# Patient Record
Sex: FEMALE | Race: WHITE | NOT HISPANIC OR LATINO | URBAN - METROPOLITAN AREA
[De-identification: names, ages, dates, MRNs, and addresses within clinical notes are randomized per-mention and may not be internally consistent; named-entity substitution may affect disease eponyms.]

---

## 2020-02-16 ENCOUNTER — INPATIENT (INPATIENT)
Facility: HOSPITAL | Age: 65
LOS: 4 days | Discharge: HOME | DRG: 291 | End: 2020-02-21
Attending: INTERNAL MEDICINE | Admitting: STUDENT IN AN ORGANIZED HEALTH CARE EDUCATION/TRAINING PROGRAM
Payer: COMMERCIAL

## 2020-02-16 VITALS — WEIGHT: 199.96 LBS | TEMPERATURE: 98 F

## 2020-02-16 DIAGNOSIS — I50.1 LEFT VENTRICULAR FAILURE, UNSPECIFIED: ICD-10-CM

## 2020-02-16 DIAGNOSIS — K38.9 DISEASE OF APPENDIX, UNSPECIFIED: Chronic | ICD-10-CM

## 2020-02-16 LAB
ALBUMIN SERPL ELPH-MCNC: 3.5 G/DL — SIGNIFICANT CHANGE UP (ref 3.3–5.2)
ALP SERPL-CCNC: 251 U/L — HIGH (ref 40–120)
ALT FLD-CCNC: 42 U/L — HIGH
ANION GAP SERPL CALC-SCNC: 16 MMOL/L — SIGNIFICANT CHANGE UP (ref 5–17)
APTT BLD: 33 SEC — SIGNIFICANT CHANGE UP (ref 27.5–36.3)
AST SERPL-CCNC: 39 U/L — HIGH
BILIRUB SERPL-MCNC: 0.4 MG/DL — SIGNIFICANT CHANGE UP (ref 0.4–2)
BUN SERPL-MCNC: 32 MG/DL — HIGH (ref 8–20)
CALCIUM SERPL-MCNC: 9.6 MG/DL — SIGNIFICANT CHANGE UP (ref 8.6–10.2)
CHLORIDE SERPL-SCNC: 106 MMOL/L — SIGNIFICANT CHANGE UP (ref 98–107)
CO2 SERPL-SCNC: 19 MMOL/L — LOW (ref 22–29)
CREAT SERPL-MCNC: 1.07 MG/DL — SIGNIFICANT CHANGE UP (ref 0.5–1.3)
GLUCOSE SERPL-MCNC: 298 MG/DL — HIGH (ref 70–99)
HCT VFR BLD CALC: 34.5 % — SIGNIFICANT CHANGE UP (ref 34.5–45)
HGB BLD-MCNC: 10.5 G/DL — LOW (ref 11.5–15.5)
INR BLD: 0.93 RATIO — SIGNIFICANT CHANGE UP (ref 0.88–1.16)
MCHC RBC-ENTMCNC: 29 PG — SIGNIFICANT CHANGE UP (ref 27–34)
MCHC RBC-ENTMCNC: 30.4 GM/DL — LOW (ref 32–36)
MCV RBC AUTO: 95.3 FL — SIGNIFICANT CHANGE UP (ref 80–100)
NT-PROBNP SERPL-SCNC: 704 PG/ML — HIGH (ref 0–300)
PLATELET # BLD AUTO: 262 K/UL — SIGNIFICANT CHANGE UP (ref 150–400)
POTASSIUM SERPL-MCNC: 4.5 MMOL/L — SIGNIFICANT CHANGE UP (ref 3.5–5.3)
POTASSIUM SERPL-SCNC: 4.5 MMOL/L — SIGNIFICANT CHANGE UP (ref 3.5–5.3)
PROT SERPL-MCNC: 6.9 G/DL — SIGNIFICANT CHANGE UP (ref 6.6–8.7)
PROTHROM AB SERPL-ACNC: 10.5 SEC — SIGNIFICANT CHANGE UP (ref 10–12.9)
RBC # BLD: 3.62 M/UL — LOW (ref 3.8–5.2)
RBC # FLD: 13.3 % — SIGNIFICANT CHANGE UP (ref 10.3–14.5)
SODIUM SERPL-SCNC: 141 MMOL/L — SIGNIFICANT CHANGE UP (ref 135–145)
TROPONIN T SERPL-MCNC: <0.01 NG/ML — SIGNIFICANT CHANGE UP (ref 0–0.06)
WBC # BLD: 8.08 K/UL — SIGNIFICANT CHANGE UP (ref 3.8–10.5)
WBC # FLD AUTO: 8.08 K/UL — SIGNIFICANT CHANGE UP (ref 3.8–10.5)

## 2020-02-16 PROCEDURE — 99223 1ST HOSP IP/OBS HIGH 75: CPT

## 2020-02-16 PROCEDURE — 93970 EXTREMITY STUDY: CPT | Mod: 26

## 2020-02-16 PROCEDURE — 71275 CT ANGIOGRAPHY CHEST: CPT | Mod: 26

## 2020-02-16 PROCEDURE — 71045 X-RAY EXAM CHEST 1 VIEW: CPT | Mod: 26

## 2020-02-16 PROCEDURE — 93010 ELECTROCARDIOGRAM REPORT: CPT

## 2020-02-16 PROCEDURE — 99285 EMERGENCY DEPT VISIT HI MDM: CPT

## 2020-02-16 RX ORDER — FUROSEMIDE 40 MG
40 TABLET ORAL ONCE
Refills: 0 | Status: COMPLETED | OUTPATIENT
Start: 2020-02-16 | End: 2020-02-16

## 2020-02-16 RX ORDER — ASPIRIN/CALCIUM CARB/MAGNESIUM 324 MG
325 TABLET ORAL ONCE
Refills: 0 | Status: COMPLETED | OUTPATIENT
Start: 2020-02-16 | End: 2020-02-16

## 2020-02-16 RX ORDER — SODIUM CHLORIDE 9 MG/ML
1000 INJECTION, SOLUTION INTRAVENOUS
Refills: 0 | Status: DISCONTINUED | OUTPATIENT
Start: 2020-02-16 | End: 2020-02-21

## 2020-02-16 RX ORDER — HEPARIN SODIUM 5000 [USP'U]/ML
5000 INJECTION INTRAVENOUS; SUBCUTANEOUS EVERY 8 HOURS
Refills: 0 | Status: DISCONTINUED | OUTPATIENT
Start: 2020-02-16 | End: 2020-02-21

## 2020-02-16 RX ORDER — GLUCAGON INJECTION, SOLUTION 0.5 MG/.1ML
1 INJECTION, SOLUTION SUBCUTANEOUS ONCE
Refills: 0 | Status: DISCONTINUED | OUTPATIENT
Start: 2020-02-16 | End: 2020-02-21

## 2020-02-16 RX ORDER — FUROSEMIDE 40 MG
20 TABLET ORAL ONCE
Refills: 0 | Status: COMPLETED | OUTPATIENT
Start: 2020-02-16 | End: 2020-02-17

## 2020-02-16 RX ORDER — DEXTROSE 50 % IN WATER 50 %
15 SYRINGE (ML) INTRAVENOUS ONCE
Refills: 0 | Status: DISCONTINUED | OUTPATIENT
Start: 2020-02-16 | End: 2020-02-21

## 2020-02-16 RX ORDER — METOPROLOL TARTRATE 50 MG
50 TABLET ORAL ONCE
Refills: 0 | Status: COMPLETED | OUTPATIENT
Start: 2020-02-16 | End: 2020-02-16

## 2020-02-16 RX ORDER — DEXTROSE 50 % IN WATER 50 %
25 SYRINGE (ML) INTRAVENOUS ONCE
Refills: 0 | Status: DISCONTINUED | OUTPATIENT
Start: 2020-02-16 | End: 2020-02-21

## 2020-02-16 RX ORDER — DEXTROSE 50 % IN WATER 50 %
12.5 SYRINGE (ML) INTRAVENOUS ONCE
Refills: 0 | Status: DISCONTINUED | OUTPATIENT
Start: 2020-02-16 | End: 2020-02-21

## 2020-02-16 RX ORDER — FUROSEMIDE 40 MG
40 TABLET ORAL
Refills: 0 | Status: DISCONTINUED | OUTPATIENT
Start: 2020-02-17 | End: 2020-02-18

## 2020-02-16 RX ORDER — INSULIN LISPRO 100/ML
VIAL (ML) SUBCUTANEOUS
Refills: 0 | Status: DISCONTINUED | OUTPATIENT
Start: 2020-02-16 | End: 2020-02-21

## 2020-02-16 RX ORDER — HYDRALAZINE HCL 50 MG
25 TABLET ORAL THREE TIMES A DAY
Refills: 0 | Status: DISCONTINUED | OUTPATIENT
Start: 2020-02-16 | End: 2020-02-17

## 2020-02-16 RX ADMIN — Medication 50 MILLIGRAM(S): at 17:42

## 2020-02-16 RX ADMIN — Medication 40 MILLIGRAM(S): at 16:37

## 2020-02-16 RX ADMIN — Medication 325 MILLIGRAM(S): at 16:21

## 2020-02-16 NOTE — H&P ADULT - NSHPLABSRESULTS_GEN_ALL_CORE
LABS:                        10.5   8.08  )-----------( 262      ( 16 Feb 2020 14:23 )             34.5     02-16    141  |  106  |  32.0<H>  ----------------------------<  298<H>  4.5   |  19.0<L>  |  1.07    Ca    9.6      16 Feb 2020 14:23    TPro  6.9  /  Alb  3.5  /  TBili  0.4  /  DBili  x   /  AST  39<H>  /  ALT  42<H>  /  AlkPhos  251<H>  02-16    PT/INR - ( 16 Feb 2020 14:23 )   PT: 10.5 sec;   INR: 0.93 ratio         PTT - ( 16 Feb 2020 14:23 )  PTT:33.0 sec    LIVER FUNCTIONS - ( 16 Feb 2020 14:23 )  Alb: 3.5 g/dL / Pro: 6.9 g/dL / ALK PHOS: 251 U/L / ALT: 42 U/L / AST: 39 U/L / GGT: x

## 2020-02-16 NOTE — ED PROVIDER NOTE - CARE PLAN
Principal Discharge DX:	Acute pulmonary edema with congestive heart failure  Secondary Diagnosis:	Hypertensive urgency  Secondary Diagnosis:	New onset of congestive heart failure

## 2020-02-16 NOTE — H&P ADULT - HISTORY OF PRESENT ILLNESS
64 year old female with hx of DM , CKD morbid obesity is coming in with c/o SOB for 5 days , patient stated that she has been on a cruise for 14 days , with SOB was seen on cruise diagnosed to have PNA , given antibiotics however had no improvement of symptoms, she also had cough but no sputum , no fever m chills night sweats   patient did have LE SWELLING , WEIGHT GAIN , LEG PAIN but denies orthopnea , did have exertional dyspnea. denies chest pain , no abdominal pain , no nausea vomiting.  while in ED found to be hypertensive treated with IV meds, and also CTA showed effusions and CHF given lasix . patient was also hypoxic requiring oxygen supplementation .

## 2020-02-16 NOTE — ED ADULT TRIAGE NOTE - CHIEF COMPLAINT QUOTE
"when I walk I can't breath and my legs are swollen.  I had an EKG done at the Urgent care and they sent me here. I was on a cruise an dgot back today after 14 days. "  Pt was SOB on the cruise for last 5 days.  Pt A& Ox4

## 2020-02-16 NOTE — ED PROVIDER NOTE - CLINICAL SUMMARY MEDICAL DECISION MAKING FREE TEXT BOX
patient arrived with sob/gayle/le edema.  labs sent. cta ordered. signed out to incoming attending pending results. Patient signed out to incoming physician.  All decisions regarding the progression of care will be made at their discretion.

## 2020-02-16 NOTE — ED PROVIDER NOTE - OBJECTIVE STATEMENT
64yoF; with pmh signif for HTN, HLD, DM; now p/w sob x5 days, associated with gayle, orthopnea, LE edema and L LE pain. c/o chest pain--sscp, tightness, radiating to left shoulder.  c/o cough. denies f/c/s. denies n/v. denies diaphoresis. denies numbness/tingling. denies lightheadedness.  patient drove to [place] in October and fly back to [place] this week and was on a cruise for 1 week.    PMH: HTN, HLD, DM  SOCIAL: No tobacco/illicit substance use/socialEtOH

## 2020-02-16 NOTE — ED ADULT NURSE NOTE - OBJECTIVE STATEMENT
Assumed care at 1400 pt co bilaterally edema to lower extremities and SOB. pt was on a 14 day cruise to the [place] [facility] [place] and 5 days ago noticed the leg swelling and SOB. pt was treated in the nurse office but has not had any relief. pt has hx of kidney failure, HTN and DM. pt placed on cardiac monitor.

## 2020-02-16 NOTE — ED CLERICAL - CLERICAL COMMENTS
[facility] sending Ivanna Mtz [birth year]- SOB, swelling in lower extremeties-> [facility] [phone]

## 2020-02-16 NOTE — ED PROVIDER NOTE - NS ED ROS FT
Constitutional: (-) fever  (-)chills  (-)sweats  Eyes/ENT: (-) blurry vision, (-) epistaxis  (-)rhinorrhea   (-) sore throat    Cardiovascular: (+) chest pain, (-) palpitations (+) edema   Respiratory: (+) cough, (+) shortness of breath   Gastrointestinal: (-)nausea  (-)vomiting, (-) diarrhea  (-) abdominal pain   :  (-)dysuria, (-)frequency, (-)urgency, (-)hematuria  Musculoskeletal: (-) neck pain, (-) back pain, (-) joint pain  Integumentary: (-) rash, (-) edema  Neurological: (-) headache, (-) altered mental status  (-)LOC

## 2020-02-16 NOTE — H&P ADULT - ASSESSMENT
1. Acute CHF exacerbation   newly diagnosed , probably diastolic .  ECHO , tele monitoring , I/O , daily weights .  lasix 40 BID.  cardio consult .  low salt diet     2. Acute hypoxic respiratory failure   on nasal cannula .  treat underlying CHF .    3. Hypertensive crises / hypertensive emergency   better controlled now.  patient takes lisinopril at home , hold off for now, give room for diuresis .  avoid beta blocker due to acute decompensated CHF   start hydralazine .    4. DM   start sliding scale. based on needs start basal bolus approach in am .    5. CKD   monitor kidney function with lasix on board     6. DLP  start lipitor, patient does not recall her home dose.     7.Morbid obesity   lifestyle modification     8. DVT prophylaxis  heparin     details discussed with patient and her  at bedside , all concerns answered

## 2020-02-16 NOTE — H&P ADULT - NSHPPHYSICALEXAM_GEN_ALL_CORE
GENERAL:  morbidly obese , ill looking , not in distress , speaking in full sentences   EYES:  Clear conjuctiva, extraocular movement intact  RESP:  diminished air entry , fine crackles at bases   CV: Regular rate and rhythm, no murmurs appreciated  GI: Soft, non-tender, non-distended  EXT: positive  pitting edema

## 2020-02-16 NOTE — ED PROVIDER NOTE - PHYSICAL EXAMINATION
General:  mild resp distress  Head:     NC/AT, EOMI, oral mucosa moist  Neck:     trachea midline  Lungs:  bibasilar crackles  CVS:     S1S2, RRR, no m/g/r  Abd:     +BS, s/nt/nd, no organomegaly  Ext:    2+ radial and pedal pulses, 1+ pedal edema  Neuro: AAOx3, no sensory/motor deficits

## 2020-02-17 DIAGNOSIS — I10 ESSENTIAL (PRIMARY) HYPERTENSION: ICD-10-CM

## 2020-02-17 DIAGNOSIS — E11.22 TYPE 2 DIABETES MELLITUS WITH DIABETIC CHRONIC KIDNEY DISEASE: ICD-10-CM

## 2020-02-17 DIAGNOSIS — I50.9 HEART FAILURE, UNSPECIFIED: ICD-10-CM

## 2020-02-17 DIAGNOSIS — E78.5 HYPERLIPIDEMIA, UNSPECIFIED: ICD-10-CM

## 2020-02-17 LAB
ANION GAP SERPL CALC-SCNC: 13 MMOL/L — SIGNIFICANT CHANGE UP (ref 5–17)
BUN SERPL-MCNC: 35 MG/DL — HIGH (ref 8–20)
CALCIUM SERPL-MCNC: 9.6 MG/DL — SIGNIFICANT CHANGE UP (ref 8.6–10.2)
CHLORIDE SERPL-SCNC: 106 MMOL/L — SIGNIFICANT CHANGE UP (ref 98–107)
CO2 SERPL-SCNC: 23 MMOL/L — SIGNIFICANT CHANGE UP (ref 22–29)
CREAT SERPL-MCNC: 1.24 MG/DL — SIGNIFICANT CHANGE UP (ref 0.5–1.3)
GLUCOSE BLDC GLUCOMTR-MCNC: 281 MG/DL — HIGH (ref 70–99)
GLUCOSE BLDC GLUCOMTR-MCNC: 315 MG/DL — HIGH (ref 70–99)
GLUCOSE BLDC GLUCOMTR-MCNC: 329 MG/DL — HIGH (ref 70–99)
GLUCOSE BLDC GLUCOMTR-MCNC: 343 MG/DL — HIGH (ref 70–99)
GLUCOSE BLDC GLUCOMTR-MCNC: 349 MG/DL — HIGH (ref 70–99)
GLUCOSE BLDC GLUCOMTR-MCNC: 351 MG/DL — HIGH (ref 70–99)
GLUCOSE SERPL-MCNC: 354 MG/DL — HIGH (ref 70–99)
HBA1C BLD-MCNC: 7.5 % — HIGH (ref 4–5.6)
HCV AB S/CO SERPL IA: 0.15 S/CO — SIGNIFICANT CHANGE UP (ref 0–0.99)
HCV AB SERPL-IMP: SIGNIFICANT CHANGE UP
POTASSIUM SERPL-MCNC: 4.6 MMOL/L — SIGNIFICANT CHANGE UP (ref 3.5–5.3)
POTASSIUM SERPL-SCNC: 4.6 MMOL/L — SIGNIFICANT CHANGE UP (ref 3.5–5.3)
SODIUM SERPL-SCNC: 142 MMOL/L — SIGNIFICANT CHANGE UP (ref 135–145)

## 2020-02-17 PROCEDURE — 99255 IP/OBS CONSLTJ NEW/EST HI 80: CPT

## 2020-02-17 PROCEDURE — 99233 SBSQ HOSP IP/OBS HIGH 50: CPT

## 2020-02-17 PROCEDURE — 93306 TTE W/DOPPLER COMPLETE: CPT | Mod: 26

## 2020-02-17 RX ORDER — LOSARTAN POTASSIUM 100 MG/1
50 TABLET, FILM COATED ORAL DAILY
Refills: 0 | Status: DISCONTINUED | OUTPATIENT
Start: 2020-02-17 | End: 2020-02-18

## 2020-02-17 RX ORDER — INSULIN GLARGINE 100 [IU]/ML
10 INJECTION, SOLUTION SUBCUTANEOUS EVERY MORNING
Refills: 0 | Status: DISCONTINUED | OUTPATIENT
Start: 2020-02-17 | End: 2020-02-17

## 2020-02-17 RX ORDER — INSULIN LISPRO 100/ML
7 VIAL (ML) SUBCUTANEOUS
Refills: 0 | Status: DISCONTINUED | OUTPATIENT
Start: 2020-02-17 | End: 2020-02-18

## 2020-02-17 RX ORDER — INSULIN GLARGINE 100 [IU]/ML
18 INJECTION, SOLUTION SUBCUTANEOUS AT BEDTIME
Refills: 0 | Status: DISCONTINUED | OUTPATIENT
Start: 2020-02-17 | End: 2020-02-18

## 2020-02-17 RX ORDER — INSULIN LISPRO 100/ML
5 VIAL (ML) SUBCUTANEOUS
Refills: 0 | Status: DISCONTINUED | OUTPATIENT
Start: 2020-02-17 | End: 2020-02-17

## 2020-02-17 RX ORDER — SIMVASTATIN 20 MG/1
40 TABLET, FILM COATED ORAL AT BEDTIME
Refills: 0 | Status: DISCONTINUED | OUTPATIENT
Start: 2020-02-17 | End: 2020-02-21

## 2020-02-17 RX ORDER — SIMETHICONE 80 MG/1
80 TABLET, CHEWABLE ORAL ONCE
Refills: 0 | Status: COMPLETED | OUTPATIENT
Start: 2020-02-17 | End: 2020-02-17

## 2020-02-17 RX ORDER — CARVEDILOL PHOSPHATE 80 MG/1
3.12 CAPSULE, EXTENDED RELEASE ORAL EVERY 12 HOURS
Refills: 0 | Status: DISCONTINUED | OUTPATIENT
Start: 2020-02-17 | End: 2020-02-19

## 2020-02-17 RX ORDER — PANTOPRAZOLE SODIUM 20 MG/1
40 TABLET, DELAYED RELEASE ORAL ONCE
Refills: 0 | Status: COMPLETED | OUTPATIENT
Start: 2020-02-17 | End: 2020-02-17

## 2020-02-17 RX ADMIN — CARVEDILOL PHOSPHATE 3.12 MILLIGRAM(S): 80 CAPSULE, EXTENDED RELEASE ORAL at 15:42

## 2020-02-17 RX ADMIN — INSULIN GLARGINE 18 UNIT(S): 100 INJECTION, SOLUTION SUBCUTANEOUS at 23:50

## 2020-02-17 RX ADMIN — Medication 4: at 08:59

## 2020-02-17 RX ADMIN — HEPARIN SODIUM 5000 UNIT(S): 5000 INJECTION INTRAVENOUS; SUBCUTANEOUS at 15:42

## 2020-02-17 RX ADMIN — INSULIN GLARGINE 10 UNIT(S): 100 INJECTION, SOLUTION SUBCUTANEOUS at 10:56

## 2020-02-17 RX ADMIN — LOSARTAN POTASSIUM 50 MILLIGRAM(S): 100 TABLET, FILM COATED ORAL at 05:22

## 2020-02-17 RX ADMIN — Medication 7 UNIT(S): at 17:11

## 2020-02-17 RX ADMIN — Medication 5: at 10:56

## 2020-02-17 RX ADMIN — Medication 40 MILLIGRAM(S): at 05:24

## 2020-02-17 RX ADMIN — CARVEDILOL PHOSPHATE 3.12 MILLIGRAM(S): 80 CAPSULE, EXTENDED RELEASE ORAL at 05:22

## 2020-02-17 RX ADMIN — Medication 5 UNIT(S): at 15:40

## 2020-02-17 RX ADMIN — SIMVASTATIN 40 MILLIGRAM(S): 20 TABLET, FILM COATED ORAL at 23:05

## 2020-02-17 RX ADMIN — SIMETHICONE 80 MILLIGRAM(S): 80 TABLET, CHEWABLE ORAL at 23:50

## 2020-02-17 RX ADMIN — Medication 4: at 15:40

## 2020-02-17 RX ADMIN — Medication 20 MILLIGRAM(S): at 10:58

## 2020-02-17 RX ADMIN — PANTOPRAZOLE SODIUM 40 MILLIGRAM(S): 20 TABLET, DELAYED RELEASE ORAL at 23:51

## 2020-02-17 RX ADMIN — HEPARIN SODIUM 5000 UNIT(S): 5000 INJECTION INTRAVENOUS; SUBCUTANEOUS at 05:22

## 2020-02-17 RX ADMIN — Medication 40 MILLIGRAM(S): at 15:42

## 2020-02-17 RX ADMIN — HEPARIN SODIUM 5000 UNIT(S): 5000 INJECTION INTRAVENOUS; SUBCUTANEOUS at 23:05

## 2020-02-17 NOTE — CONSULT NOTE ADULT - PROBLEM SELECTOR RECOMMENDATION 9
-agree with Lasix 40 mh IV bid  -TTE in am  -daily weights  -low salt diet  -will need ischemic evaluation before discharge

## 2020-02-17 NOTE — CONSULT NOTE ADULT - SUBJECTIVE AND OBJECTIVE BOX
History obtained by: Patient and medical record     obtained: No    Chief complaint:  "I can't breathe"    HPI:  This is 65 y/o female with hx of IDDM2, HTN, HLD, CKD who presents with SOB and b/l LE edema x5 days. Pt was on a cruise when she developed SÁNCHEZ, she was diagnosed with PNA by a cruise doctor and started on antibiotics. Her symptoms have not improved so she decided to come to the ER. Pt denies chest pain or palpitations. Troponin negative x1, proBNP 704. LE dopplers negative for DVT. CTA negative for PE but showing pulmonary edema and small b/l pleural effusions. EKG reveals ST with nonspecific T-wave abnormality. Pt states she had a cardiac cath at [facility] 6 years ago and was told everything was normal. She does not have a cardiologist.        REVIEW OF SYMPTOMS:   Cardiovascular:  as per HPI  Respiratory:  c/o dyspnea and cough,     Genitourinary:  No dysuria, no hematuria;   Gastrointestinal:   No dark color stool, no melena, no diarrhea, no constipation, no abdominal pain;   Neurological: No headache, no dizziness, no slurred speech;    Psychiatric: No agitation, no anxiety.  ALL OTHER REVIEW OF SYSTEMS ARE NEGATIVE.    MEDICATIONS  (STANDING):  carvedilol 3.125 milliGRAM(s) Oral every 12 hours  dextrose 5%. 1000 milliLiter(s) (50 mL/Hr) IV Continuous <Continuous>  dextrose 50% Injectable 12.5 Gram(s) IV Push once  dextrose 50% Injectable 25 Gram(s) IV Push once  dextrose 50% Injectable 25 Gram(s) IV Push once  furosemide   Injectable 40 milliGRAM(s) IV Push two times a day  furosemide   Injectable 20 milliGRAM(s) IV Push once  heparin  Injectable 5000 Unit(s) SubCutaneous every 8 hours  insulin lispro (HumaLOG) corrective regimen sliding scale   SubCutaneous three times a day before meals  losartan 50 milliGRAM(s) Oral daily    MEDICATIONS  (PRN):  dextrose 40% Gel 15 Gram(s) Oral once PRN Blood Glucose LESS THAN 70 milliGRAM(s)/deciliter  glucagon  Injectable 1 milliGRAM(s) IntraMuscular once PRN Glucose LESS THAN 70 milligrams/deciliter        PAST MEDICAL & SURGICAL HISTORY:  Dyslipidemia  CKD (chronic kidney disease)  Diabetes mellitus  Other and unspecified diseases of appendix      FAMILY HISTORY:  No pertinent family history in first degree relatives      SOCIAL HISTORY: lives with  in [place]    CIGARETTES: former smoker, quit 26 years ago    ALCOHOL: denies    DRUGS: denies    Vital Signs Last 24 Hrs  T(C): 36.4 (16 Feb 2020 23:55), Max: 37 (16 Feb 2020 17:18)  T(F): 97.5 (16 Feb 2020 23:55), Max: 98.6 (16 Feb 2020 17:18)  HR: 72 (17 Feb 2020 00:44) (63 - 89)  BP: 146/76 (17 Feb 2020 00:44) (146/76 - 231/94)  BP(mean): --  RR: 16 (16 Feb 2020 23:55) (16 - 20)  SpO2: 99% (16 Feb 2020 23:55) (93% - 99%)    PHYSICAL EXAM:  General: WN/WD NAD  Neurology: A&Ox3, nonfocal, ASH x 4  Eyes: PERRL/ EOMI, Gross vision intact  ENT/Neck: Neck supple, trachea midline, No JVD, Gross hearing intact  Respiratory: b/l exp wheezes  CV: RRR, S1S2, no gross murmurs  Abdominal: Soft, NT, ND +BS,   Extremities: LE +3 pitting edema, + peripheral pulses  Skin: No Rashes, Hematoma, Ecchymosis        INTERPRETATION OF TELEMETRY: SR 70's    ECG:  bpm, nonspecific T-wave abnormality        I&O's Detail      LABS:                        10.5   8.08  )-----------( 262      ( 16 Feb 2020 14:23 )             34.5     02-16    141  |  106  |  32.0<H>  ----------------------------<  298<H>  4.5   |  19.0<L>  |  1.07    Ca    9.6      16 Feb 2020 14:23    TPro  6.9  /  Alb  3.5  /  TBili  0.4  /  DBili  x   /  AST  39<H>  /  ALT  42<H>  /  AlkPhos  251<H>  02-16    CARDIAC MARKERS ( 16 Feb 2020 14:23 )  x     / <0.01 ng/mL / x     / x     / x          PT/INR - ( 16 Feb 2020 14:23 )   PT: 10.5 sec;   INR: 0.93 ratio         PTT - ( 16 Feb 2020 14:23 )  PTT:33.0 sec    I&O's Summary      RADIOLOGY & ADDITIONAL STUDIES:    < from: CT Angio Chest w/ IV Cont (02.16.20 @ 20:26) >  IMPRESSION:     1. No pulmonary embolism.  2. Pulmonary edema.  3. Small bilateral pleural effusions.      RUDDY KIM   This document has been electronically signed. Feb 16 2020  9:12PM    < end of copied text >    PREVIOUS DIAGNOSTIC TESTING:      ECHO: n/a      STRESS: n/a        CATHETERIZATION: n/a

## 2020-02-17 NOTE — CONSULT NOTE ADULT - ATTENDING COMMENTS
Pt is seen, examined, chart reviewed, d/w NP/PA.  64F hx DM, HTN, HLD, CKD (h/o LHC 6 years ago at [facility] reportedly normal coronaries)  has 2-3 weeks h/o bl LE edema, goes on Cruise and admits dietary indiscretion presents with SÁNCHEZ. She was diagnosed with PNA by a cruise doctor and started on antibiotics. Her symptoms have not improved so she decided to come to the ER. in ED Trop neg x1, proBNP 704. LE dopplers negative for DVT. CTA negative for PE but showing pulmonary edema and small b/l pleural effusions. EKG reveals ST with nonspecific T-wave abnormality.    Acute Congestive Heart failure  IV Lasix  Echo  Ischemic evaluation before discharge

## 2020-02-17 NOTE — CONSULT NOTE ADULT - PROBLEM SELECTOR RECOMMENDATION 3
-pt states she couldn't tolerate Lipitor and was placed on a different statin but doesn't remember name, will start on simvastatin 40 mg  -lipid panel

## 2020-02-17 NOTE — PROGRESS NOTE ADULT - ASSESSMENT
1. Acute CHF exacerbation   newly diagnosed  - pending ECHO  - spoke to cardiology, IV lasix. ischemic workup  - cont IV lasix 40mg BID  - strict Is and Os and daily weight  - low salt diet     2. Acute hypoxic respiratory failure - resolved. Pt saturating well on room air now.    3. Hypertensive crises/ hypertensive emergency   better controlled now  card consult appreciated  losartan 50mg and Coreg 3.125mg q12    4. DM   Lantus 10u and ISS  monitor FS    5. CKD   monitor kidney function with lasix on board     6. DLP  start lipitor, patient does not recall her home dose.     7.Morbid obesity   lifestyle modification     8. DVT prophylaxis  heparin     details discussed with patient and her  at bedside , all concerns answered 64 year old female with hx of DM , CKD morbid obesity is coming in with c/o SOB for 5 days ,     1. Acute CHF exacerbation   newly diagnosed  - check ECHO  - spoke to cardiology, IV lasix. ischemic workup (cardiac cath vs nuclear stress test pending echo)  - cont IV lasix 40mg BID  - strict Is and Os and daily weight  - low salt diet     2. Acute hypoxic respiratory failure - resolved. Pt saturating well on room air now.    3. Hypertensive crises/ hypertensive emergency   better controlled now  card consult appreciated  losartan 50mg and Coreg 3.125mg q12    4. Diarrhea  - monitor. maybe induced by recent antibiotics use    4. DM   Lantus 10u and ISS  monitor FS  Hba1c 7.4    5. CKD   monitor kidney function with lasix on board     6. DLP  start lipitor, patient does not recall her home dose.     7.Morbid obesity   lifestyle modification     8. DVT prophylaxis  heparin     details discussed with patient and her  at bedside , all concerns answered 64 year old female with hx of DM , CKD morbid obesity is coming in with c/o SOB for 5 days ,     1. Acute CHF exacerbation   newly diagnosed  - check ECHO  - spoke to cardiology, IV lasix. ischemic workup (cardiac cath vs nuclear stress test pending echo)  - cont IV lasix 40mg BID  - strict Is and Os and daily weight  - low salt diet     2. Acute hypoxic respiratory failure - resolved. Pt saturating well on room air now.    3. Hypertensive crises/ hypertensive emergency   better controlled now  card consult appreciated  losartan 50mg and Coreg 3.125mg q12    4. Diarrhea  - monitor. maybe induced by recent antibiotics use    4. DM   Lantus 10u and ISS  monitor FS  Hba1c 7.4    5. suspect CKD3   no baseline lab   monitor kidney function with lasix on board     6. DLP  start lipitor, patient does not recall her home dose.     7.Morbid obesity   lifestyle modification     8. DVT prophylaxis  heparin     details discussed with patient and her  at bedside , all concerns answered

## 2020-02-17 NOTE — CONSULT NOTE ADULT - ASSESSMENT
This is 65 y/o female with hx of IDDM2, HTN, HLD, CKD who presents with SOB and b/l LE edema x5 days. Pt was on a cruise when she developed SÁNCHEZ, she was diagnosed with PNA by a cruise doctor and started on antibiotics. Her symptoms have not improved so she decided to come to the ER. Pt denies chest pain or palpitations. Troponin negative x1, proBNP 704. LE dopplers negative for DVT. CTA negative for PE but showing pulmonary edema and small b/l pleural effusions. EKG reveals ST with nonspecific T-wave abnormality. Pt states she had a cardiac cath at [place] 6 years ago and was told everything was normal. She does not have a cardiologist.

## 2020-02-17 NOTE — PROGRESS NOTE ADULT - SUBJECTIVE AND OBJECTIVE BOX
Patient is a 64y old  Female who presents with a chief complaint of SOB (17 Feb 2020 01:24)      Patient seen and examined at bedside. No overnight events reported. Pt reports SOB improved. no CP. Pt c/o two days of loose diarrhea.     ALLERGIES:  Levaquin (Unknown)    MEDICATIONS  (STANDING):  carvedilol 3.125 milliGRAM(s) Oral every 12 hours  dextrose 5%. 1000 milliLiter(s) (50 mL/Hr) IV Continuous <Continuous>  dextrose 50% Injectable 12.5 Gram(s) IV Push once  dextrose 50% Injectable 25 Gram(s) IV Push once  dextrose 50% Injectable 25 Gram(s) IV Push once  furosemide   Injectable 40 milliGRAM(s) IV Push two times a day  heparin  Injectable 5000 Unit(s) SubCutaneous every 8 hours  insulin glargine Injectable (LANTUS) 10 Unit(s) SubCutaneous every morning  insulin lispro (HumaLOG) corrective regimen sliding scale   SubCutaneous three times a day before meals  losartan 50 milliGRAM(s) Oral daily  simvastatin 40 milliGRAM(s) Oral at bedtime    MEDICATIONS  (PRN):  dextrose 40% Gel 15 Gram(s) Oral once PRN Blood Glucose LESS THAN 70 milliGRAM(s)/deciliter  glucagon  Injectable 1 milliGRAM(s) IntraMuscular once PRN Glucose LESS THAN 70 milligrams/deciliter    Vital Signs Last 24 Hrs  T(F): 98 (17 Feb 2020 09:30), Max: 98.6 (16 Feb 2020 17:18)  HR: 76 (17 Feb 2020 10:54) (63 - 89)  BP: 162/72 (17 Feb 2020 10:54) (146/76 - 231/94)  RR: 18 (17 Feb 2020 10:54) (16 - 20)  SpO2: 96% (17 Feb 2020 10:54) (93% - 99%)  I&O's Summary    16 Feb 2020 07:01  -  17 Feb 2020 07:00  --------------------------------------------------------  IN: 100 mL / OUT: 600 mL / NET: -500 mL    17 Feb 2020 07:01  -  17 Feb 2020 12:41  --------------------------------------------------------  IN: 240 mL / OUT: 300 mL / NET: -60 mL      GENERAL: NAD  HEAD:  Atraumatic, Normocephalic  EYES: EOMI, PERRLA, sclera clear  NECK: Supple  CHEST/LUNG: Clear to auscultation bilaterally; + bibasilar crackles  HEART: Regular rate and rhythm; No murmurs  ABDOMEN: Soft, Nontender, Nondistended; Bowel sounds present  EXTREMITIES: bilateral trace to 1+ pitting edema to above ankle  PSYCH: AAOx3  NEUROLOGY: non-focal  SKIN: No rashes or lesions    LABS:                        10.5   8.08  )-----------( 262      ( 16 Feb 2020 14:23 )             34.5     02-17    142  |  106  |  35.0  ----------------------------<  354  4.6   |  23.0  |  1.24    Ca    9.6      17 Feb 2020 05:59    TPro  6.9  /  Alb  3.5  /  TBili  0.4  /  DBili  x   /  AST  39  /  ALT  42  /  AlkPhos  251  02-16        eGFR if Non African American: 46 mL/min/1.73M2 (02-17-20 @ 05:59)  eGFR if African American: 53 mL/min/1.73M2 (02-17-20 @ 05:59)    PT/INR - ( 16 Feb 2020 14:23 )   PT: 10.5 sec;   INR: 0.93 ratio         PTT - ( 16 Feb 2020 14:23 )  PTT:33.0 sec      CARDIAC MARKERS ( 16 Feb 2020 14:23 )  x     / <0.01 ng/mL / x     / x     / x          POCT Blood Glucose.: 281 mg/dL (17 Feb 2020 12:09)  POCT Blood Glucose.: 351 mg/dL (17 Feb 2020 10:39)  POCT Blood Glucose.: 343 mg/dL (17 Feb 2020 08:16)    02-17 [id number] 7.5        RADIOLOGY & ADDITIONAL TESTS:    Care Discussed with Consultants/Other Providers:

## 2020-02-17 NOTE — CONSULT NOTE ADULT - PROBLEM SELECTOR RECOMMENDATION 2
-pt states she's on Losartan and Lisinopril at home, will d/c Lisinopril and keep on Losartan 50 mg  -add Coreg 3.125 mg bid

## 2020-02-18 LAB
ALBUMIN SERPL ELPH-MCNC: 3.8 G/DL — SIGNIFICANT CHANGE UP (ref 3.3–5.2)
ALP SERPL-CCNC: 207 U/L — HIGH (ref 40–120)
ALT FLD-CCNC: 33 U/L — HIGH
ANION GAP SERPL CALC-SCNC: 16 MMOL/L — SIGNIFICANT CHANGE UP (ref 5–17)
APPEARANCE UR: CLEAR — SIGNIFICANT CHANGE UP
AST SERPL-CCNC: 26 U/L — SIGNIFICANT CHANGE UP
BASOPHILS # BLD AUTO: 0.04 K/UL — SIGNIFICANT CHANGE UP (ref 0–0.2)
BASOPHILS NFR BLD AUTO: 0.4 % — SIGNIFICANT CHANGE UP (ref 0–2)
BILIRUB DIRECT SERPL-MCNC: 0.1 MG/DL — SIGNIFICANT CHANGE UP (ref 0–0.3)
BILIRUB INDIRECT FLD-MCNC: 0.3 MG/DL — SIGNIFICANT CHANGE UP (ref 0.2–1)
BILIRUB SERPL-MCNC: 0.4 MG/DL — SIGNIFICANT CHANGE UP (ref 0.4–2)
BILIRUB UR-MCNC: NEGATIVE — SIGNIFICANT CHANGE UP
BUN SERPL-MCNC: 56 MG/DL — HIGH (ref 8–20)
CALCIUM SERPL-MCNC: 9.8 MG/DL — SIGNIFICANT CHANGE UP (ref 8.6–10.2)
CHLORIDE SERPL-SCNC: 102 MMOL/L — SIGNIFICANT CHANGE UP (ref 98–107)
CO2 SERPL-SCNC: 21 MMOL/L — LOW (ref 22–29)
COLOR SPEC: YELLOW — SIGNIFICANT CHANGE UP
COMMENT - URINE: SIGNIFICANT CHANGE UP
CREAT SERPL-MCNC: 1.89 MG/DL — HIGH (ref 0.5–1.3)
DIFF PNL FLD: NEGATIVE — SIGNIFICANT CHANGE UP
EOSINOPHIL # BLD AUTO: 0.36 K/UL — SIGNIFICANT CHANGE UP (ref 0–0.5)
EOSINOPHIL NFR BLD AUTO: 3.9 % — SIGNIFICANT CHANGE UP (ref 0–6)
EPI CELLS # UR: SIGNIFICANT CHANGE UP
GLUCOSE BLDC GLUCOMTR-MCNC: 120 MG/DL — HIGH (ref 70–99)
GLUCOSE BLDC GLUCOMTR-MCNC: 234 MG/DL — HIGH (ref 70–99)
GLUCOSE BLDC GLUCOMTR-MCNC: 277 MG/DL — HIGH (ref 70–99)
GLUCOSE BLDC GLUCOMTR-MCNC: 341 MG/DL — HIGH (ref 70–99)
GLUCOSE BLDC GLUCOMTR-MCNC: 363 MG/DL — HIGH (ref 70–99)
GLUCOSE BLDC GLUCOMTR-MCNC: 398 MG/DL — HIGH (ref 70–99)
GLUCOSE BLDC GLUCOMTR-MCNC: 419 MG/DL — HIGH (ref 70–99)
GLUCOSE BLDC GLUCOMTR-MCNC: 65 MG/DL — LOW (ref 70–99)
GLUCOSE BLDC GLUCOMTR-MCNC: 70 MG/DL — SIGNIFICANT CHANGE UP (ref 70–99)
GLUCOSE SERPL-MCNC: 340 MG/DL — HIGH (ref 70–99)
GLUCOSE UR QL: 50 MG/DL
HCT VFR BLD CALC: 31.5 % — LOW (ref 34.5–45)
HGB BLD-MCNC: 9.8 G/DL — LOW (ref 11.5–15.5)
IMM GRANULOCYTES NFR BLD AUTO: 0.4 % — SIGNIFICANT CHANGE UP (ref 0–1.5)
KETONES UR-MCNC: NEGATIVE — SIGNIFICANT CHANGE UP
LEUKOCYTE ESTERASE UR-ACNC: ABNORMAL
LYMPHOCYTES # BLD AUTO: 1.18 K/UL — SIGNIFICANT CHANGE UP (ref 1–3.3)
LYMPHOCYTES # BLD AUTO: 12.9 % — LOW (ref 13–44)
MAGNESIUM SERPL-MCNC: 2.1 MG/DL — SIGNIFICANT CHANGE UP (ref 1.6–2.6)
MCHC RBC-ENTMCNC: 29.4 PG — SIGNIFICANT CHANGE UP (ref 27–34)
MCHC RBC-ENTMCNC: 31.1 GM/DL — LOW (ref 32–36)
MCV RBC AUTO: 94.6 FL — SIGNIFICANT CHANGE UP (ref 80–100)
MONOCYTES # BLD AUTO: 0.82 K/UL — SIGNIFICANT CHANGE UP (ref 0–0.9)
MONOCYTES NFR BLD AUTO: 8.9 % — SIGNIFICANT CHANGE UP (ref 2–14)
NEUTROPHILS # BLD AUTO: 6.73 K/UL — SIGNIFICANT CHANGE UP (ref 1.8–7.4)
NEUTROPHILS NFR BLD AUTO: 73.5 % — SIGNIFICANT CHANGE UP (ref 43–77)
NITRITE UR-MCNC: NEGATIVE — SIGNIFICANT CHANGE UP
PH UR: 5 — SIGNIFICANT CHANGE UP (ref 5–8)
PLATELET # BLD AUTO: 249 K/UL — SIGNIFICANT CHANGE UP (ref 150–400)
POTASSIUM SERPL-MCNC: 4.7 MMOL/L — SIGNIFICANT CHANGE UP (ref 3.5–5.3)
POTASSIUM SERPL-SCNC: 4.7 MMOL/L — SIGNIFICANT CHANGE UP (ref 3.5–5.3)
PROT SERPL-MCNC: 6.6 G/DL — SIGNIFICANT CHANGE UP (ref 6.6–8.7)
PROT UR-MCNC: 100 MG/DL
RBC # BLD: 3.33 M/UL — LOW (ref 3.8–5.2)
RBC # FLD: 13.3 % — SIGNIFICANT CHANGE UP (ref 10.3–14.5)
RBC CASTS # UR COMP ASSIST: NEGATIVE /HPF — SIGNIFICANT CHANGE UP (ref 0–4)
SODIUM SERPL-SCNC: 139 MMOL/L — SIGNIFICANT CHANGE UP (ref 135–145)
SP GR SPEC: 1.02 — SIGNIFICANT CHANGE UP (ref 1.01–1.02)
UROBILINOGEN FLD QL: NEGATIVE MG/DL — SIGNIFICANT CHANGE UP
WBC # BLD: 9.17 K/UL — SIGNIFICANT CHANGE UP (ref 3.8–10.5)
WBC # FLD AUTO: 9.17 K/UL — SIGNIFICANT CHANGE UP (ref 3.8–10.5)
WBC UR QL: SIGNIFICANT CHANGE UP

## 2020-02-18 PROCEDURE — 99232 SBSQ HOSP IP/OBS MODERATE 35: CPT

## 2020-02-18 PROCEDURE — 99233 SBSQ HOSP IP/OBS HIGH 50: CPT

## 2020-02-18 PROCEDURE — 71045 X-RAY EXAM CHEST 1 VIEW: CPT | Mod: 26

## 2020-02-18 PROCEDURE — 99223 1ST HOSP IP/OBS HIGH 75: CPT

## 2020-02-18 RX ORDER — HYDRALAZINE HCL 50 MG
100 TABLET ORAL EVERY 8 HOURS
Refills: 0 | Status: DISCONTINUED | OUTPATIENT
Start: 2020-02-18 | End: 2020-02-21

## 2020-02-18 RX ORDER — INSULIN LISPRO 100/ML
10 VIAL (ML) SUBCUTANEOUS
Refills: 0 | Status: DISCONTINUED | OUTPATIENT
Start: 2020-02-19 | End: 2020-02-21

## 2020-02-18 RX ORDER — INSULIN LISPRO 100/ML
10 VIAL (ML) SUBCUTANEOUS
Refills: 0 | Status: DISCONTINUED | OUTPATIENT
Start: 2020-02-18 | End: 2020-02-18

## 2020-02-18 RX ORDER — DEXTROSE 50 % IN WATER 50 %
15 SYRINGE (ML) INTRAVENOUS ONCE
Refills: 0 | Status: COMPLETED | OUTPATIENT
Start: 2020-02-18 | End: 2020-02-18

## 2020-02-18 RX ORDER — INSULIN GLARGINE 100 [IU]/ML
20 INJECTION, SOLUTION SUBCUTANEOUS ONCE
Refills: 0 | Status: COMPLETED | OUTPATIENT
Start: 2020-02-18 | End: 2020-02-18

## 2020-02-18 RX ORDER — INSULIN GLARGINE 100 [IU]/ML
20 INJECTION, SOLUTION SUBCUTANEOUS AT BEDTIME
Refills: 0 | Status: DISCONTINUED | OUTPATIENT
Start: 2020-02-19 | End: 2020-02-19

## 2020-02-18 RX ORDER — ACETAMINOPHEN 500 MG
650 TABLET ORAL EVERY 6 HOURS
Refills: 0 | Status: DISCONTINUED | OUTPATIENT
Start: 2020-02-18 | End: 2020-02-21

## 2020-02-18 RX ORDER — INSULIN GLARGINE 100 [IU]/ML
22 INJECTION, SOLUTION SUBCUTANEOUS AT BEDTIME
Refills: 0 | Status: DISCONTINUED | OUTPATIENT
Start: 2020-02-18 | End: 2020-02-18

## 2020-02-18 RX ORDER — INSULIN LISPRO 100/ML
12 VIAL (ML) SUBCUTANEOUS ONCE
Refills: 0 | Status: COMPLETED | OUTPATIENT
Start: 2020-02-18 | End: 2020-02-18

## 2020-02-18 RX ADMIN — Medication 650 MILLIGRAM(S): at 21:59

## 2020-02-18 RX ADMIN — LOSARTAN POTASSIUM 50 MILLIGRAM(S): 100 TABLET, FILM COATED ORAL at 06:28

## 2020-02-18 RX ADMIN — INSULIN GLARGINE 20 UNIT(S): 100 INJECTION, SOLUTION SUBCUTANEOUS at 22:37

## 2020-02-18 RX ADMIN — Medication 5: at 08:32

## 2020-02-18 RX ADMIN — Medication 7 UNIT(S): at 08:32

## 2020-02-18 RX ADMIN — Medication 650 MILLIGRAM(S): at 22:39

## 2020-02-18 RX ADMIN — Medication 100 MILLIGRAM(S): at 21:58

## 2020-02-18 RX ADMIN — HEPARIN SODIUM 5000 UNIT(S): 5000 INJECTION INTRAVENOUS; SUBCUTANEOUS at 06:28

## 2020-02-18 RX ADMIN — Medication 40 MILLIGRAM(S): at 06:28

## 2020-02-18 RX ADMIN — HEPARIN SODIUM 5000 UNIT(S): 5000 INJECTION INTRAVENOUS; SUBCUTANEOUS at 14:19

## 2020-02-18 RX ADMIN — Medication 6: at 14:18

## 2020-02-18 RX ADMIN — CARVEDILOL PHOSPHATE 3.12 MILLIGRAM(S): 80 CAPSULE, EXTENDED RELEASE ORAL at 18:07

## 2020-02-18 RX ADMIN — INSULIN GLARGINE 20 UNIT(S): 100 INJECTION, SOLUTION SUBCUTANEOUS at 14:19

## 2020-02-18 RX ADMIN — Medication 12 UNIT(S): at 16:22

## 2020-02-18 RX ADMIN — Medication 10 UNIT(S): at 18:07

## 2020-02-18 RX ADMIN — Medication 15 GRAM(S): at 21:57

## 2020-02-18 RX ADMIN — Medication 2: at 18:07

## 2020-02-18 RX ADMIN — SIMVASTATIN 40 MILLIGRAM(S): 20 TABLET, FILM COATED ORAL at 21:59

## 2020-02-18 RX ADMIN — HEPARIN SODIUM 5000 UNIT(S): 5000 INJECTION INTRAVENOUS; SUBCUTANEOUS at 21:59

## 2020-02-18 RX ADMIN — CARVEDILOL PHOSPHATE 3.12 MILLIGRAM(S): 80 CAPSULE, EXTENDED RELEASE ORAL at 06:28

## 2020-02-18 NOTE — PROGRESS NOTE ADULT - ATTENDING COMMENTS
Pt is seen, examined, chart reviewed, d/w NP/PA.  will need ischemic evaluation - nuclear stress testing in am  NPO past midnight

## 2020-02-18 NOTE — CONSULT NOTE ADULT - SUBJECTIVE AND OBJECTIVE BOX
[facility] DIVISION OF KIDNEY DISEASES AND HYPERTENSION -- INITIAL CONSULT NOTE  --------------------------------------------------------------------------------  HPI:        PAST HISTORY  --------------------------------------------------------------------------------  PAST MEDICAL & SURGICAL HISTORY:  Dyslipidemia  CKD (chronic kidney disease)  Diabetes mellitus  Other and unspecified diseases of appendix    FAMILY HISTORY:  No pertinent family history in first degree relatives    PAST SOCIAL HISTORY:    ALLERGIES & MEDICATIONS  --------------------------------------------------------------------------------  Allergies    Levaquin (Unknown)    Intolerances      Standing Inpatient Medications  carvedilol 3.125 milliGRAM(s) Oral every 12 hours  dextrose 5%. 1000 milliLiter(s) IV Continuous <Continuous>  dextrose 50% Injectable 12.5 Gram(s) IV Push once  dextrose 50% Injectable 25 Gram(s) IV Push once  dextrose 50% Injectable 25 Gram(s) IV Push once  heparin  Injectable 5000 Unit(s) SubCutaneous every 8 hours  insulin glargine Injectable (LANTUS) 22 Unit(s) SubCutaneous at bedtime  insulin glargine Injectable (LANTUS) 20 Unit(s) SubCutaneous once  insulin lispro (HumaLOG) corrective regimen sliding scale   SubCutaneous three times a day before meals  insulin lispro Injectable (HumaLOG) 10 Unit(s) SubCutaneous three times a day before meals  losartan 50 milliGRAM(s) Oral daily  simvastatin 40 milliGRAM(s) Oral at bedtime    PRN Inpatient Medications  dextrose 40% Gel 15 Gram(s) Oral once PRN  glucagon  Injectable 1 milliGRAM(s) IntraMuscular once PRN      REVIEW OF SYSTEMS  --------------------------------------------------------------------------------  Gen: No weight changes, fatigue, fevers/chills, weakness  Skin: No rashes  Head/Eyes/Ears/Mouth: No headache; Normal hearing; Normal vision w/o blurriness; No sinus pain/discomfort, sore throat  Respiratory: No dyspnea, cough, wheezing, hemoptysis  CV: No chest pain, PND, orthopnea  GI: No abdominal pain, diarrhea, constipation, nausea, vomiting, melena, hematochezia  : No increased frequency, dysuria, hematuria, nocturia  MSK: No joint pain/swelling; no back pain; no edema  Neuro: No dizziness/lightheadedness, weakness, seizures, numbness, tingling  Heme: No easy bruising or bleeding  Endo: No heat/cold intolerance  Psych: No significant nervousness, anxiety, stress, depression    All other systems were reviewed and are negative, except as noted.    VITALS/PHYSICAL EXAM  --------------------------------------------------------------------------------  T(C): 36.6 (02-18-20 @ 06:36), Max: 36.7 (02-17-20 @ 15:35)  HR: 73 (02-18-20 @ 06:36) (73 - 84)  BP: 171/72 (02-18-20 @ 06:36) (168/74 - 171/72)  RR: 18 (02-18-20 @ 06:36) (18 - 18)  SpO2: 95% (02-18-20 @ 06:36) (95% - 98%)  Wt(kg): --  Height (cm): 152.4 (02-17-20 @ 22:30)  Weight (kg): 90.7 (02-16-20 @ 12:43)  BMI (kg/m2): 39.1 (02-17-20 @ 22:30)  BSA (m2): 1.87 (02-17-20 @ 22:30)      02-17-20 @ 07:01  -  02-18-20 @ 07:00  --------------------------------------------------------  IN: 480 mL / OUT: 900 mL / NET: -420 mL      Physical Exam:  	Gen: NAD, well-appearing  	HEENT: PERRL, supple neck, clear oropharynx  	Pulm: CTA B/L  	CV: RRR, S1S2; no rub  	Back: No spinal or CVA tenderness; no sacral edema  	Abd: +BS, soft, nontender/nondistended  	: No suprapubic tenderness  	UE: Warm, FROM, no clubbing, intact strength; no edema; no asterixis  	LE: Warm, FROM, no clubbing, intact strength; no edema  	Neuro: No focal deficits, intact gait  	Psych: Normal affect and mood  	Skin: Warm, without rashes  	Vascular access:    LABS/STUDIES  --------------------------------------------------------------------------------              9.8    9.17  >-----------<  249      [02-18-20 @ 05:46]              31.5     139  |  102  |  56.0  ----------------------------<  340      [02-18-20 @ 05:46]  4.7   |  21.0  |  1.89        Ca     9.8     [02-18-20 @ 05:46]      Mg     2.1     [02-18-20 @ 05:46]    TPro  6.6  /  Alb  3.8  /  TBili  0.4  /  DBili  0.1  /  AST  26  /  ALT  33  /  AlkPhos  207  [02-18-20 @ 05:46]    PT/INR: PT 10.5 , INR 0.93       [02-16-20 @ 14:23]  PTT: 33.0       [02-16-20 @ 14:23]    Troponin <0.01      [02-16-20 @ 14:23]    Creatinine Trend:  SCr 1.89 [02-18 @ 05:46]  SCr 1.24 [02-17 @ 05:59]  SCr 1.07 [02-16 @ 14:23]        HbA1c 7.5      [02-17-20 @ 05:59]    HCV 0.15, Nonreact      [02-17-20 @ 14:52] [facility] DIVISION OF KIDNEY DISEASES AND HYPERTENSION -- INITIAL CONSULT NOTE  --------------------------------------------------------------------------------  HPI:        PAST HISTORY  --------------------------------------------------------------------------------  PAST MEDICAL & SURGICAL HISTORY:  Dyslipidemia  CKD (chronic kidney disease)  Diabetes mellitus  Other and unspecified diseases of appendix    FAMILY HISTORY:  No pertinent family history in first degree relatives    PAST SOCIAL HISTORY:    ALLERGIES & MEDICATIONS  --------------------------------------------------------------------------------  Allergies  Levaquin (Unknown)    Intolerances      Standing Inpatient Medications  carvedilol 3.125 milliGRAM(s) Oral every 12 hours  dextrose 5%. 1000 milliLiter(s) IV Continuous <Continuous>  dextrose 50% Injectable 12.5 Gram(s) IV Push once  dextrose 50% Injectable 25 Gram(s) IV Push once  dextrose 50% Injectable 25 Gram(s) IV Push once  heparin  Injectable 5000 Unit(s) SubCutaneous every 8 hours  insulin glargine Injectable (LANTUS) 22 Unit(s) SubCutaneous at bedtime  insulin glargine Injectable (LANTUS) 20 Unit(s) SubCutaneous once  insulin lispro (HumaLOG) corrective regimen sliding scale   SubCutaneous three times a day before meals  insulin lispro Injectable (HumaLOG) 10 Unit(s) SubCutaneous three times a day before meals  losartan 50 milliGRAM(s) Oral daily  simvastatin 40 milliGRAM(s) Oral at bedtime    PRN Inpatient Medications  dextrose 40% Gel 15 Gram(s) Oral once PRN  glucagon  Injectable 1 milliGRAM(s) IntraMuscular once PRN      REVIEW OF SYSTEMS  --------------------------------------------------------------------------------  Gen: No weight changes, fatigue, fevers/chills, weakness  Skin: No rashes  Head/Eyes/Ears/Mouth: No headache; Normal hearing; Normal vision w/o blurriness; No sinus pain/discomfort, sore throat  Respiratory: No dyspnea, cough, wheezing, hemoptysis  CV: No chest pain, PND, orthopnea  GI: No abdominal pain, diarrhea, constipation, nausea, vomiting, melena, hematochezia  : No increased frequency, dysuria, hematuria, nocturia  MSK: No joint pain/swelling; no back pain; no edema  Neuro: No dizziness/lightheadedness, weakness, seizures, numbness, tingling  Heme: No easy bruising or bleeding  Endo: No heat/cold intolerance  Psych: No significant nervousness, anxiety, stress, depression    All other systems were reviewed and are negative, except as noted.    VITALS/PHYSICAL EXAM  --------------------------------------------------------------------------------  T(C): 36.6 (02-18-20 @ 06:36), Max: 36.7 (02-17-20 @ 15:35)  HR: 73 (02-18-20 @ 06:36) (73 - 84)  BP: 171/72 (02-18-20 @ 06:36) (168/74 - 171/72)  RR: 18 (02-18-20 @ 06:36) (18 - 18)  SpO2: 95% (02-18-20 @ 06:36) (95% - 98%)  Wt(kg): --  Height (cm): 152.4 (02-17-20 @ 22:30)  Weight (kg): 90.7 (02-16-20 @ 12:43)  BMI (kg/m2): 39.1 (02-17-20 @ 22:30)  BSA (m2): 1.87 (02-17-20 @ 22:30)      02-17-20 @ 07:01  -  02-18-20 @ 07:00  --------------------------------------------------------  IN: 480 mL / OUT: 900 mL / NET: -420 mL      Physical Exam:  	Gen: NAD, well-appearing  	HEENT:  supple neck  	Pulm: CTA B/L  	CV: RRR, S1S2; no rub  	Back: No spinal or CVA tenderness; no sacral edema  	Abd: +BS, soft, nontender/nondistended  	: No suprapubic tenderness  	UE: Warm, no edema; no asterixis  	LE: Warm, no edema  	Neuro: No focal deficits  	Psych: Normal affect and mood  	Skin: Warm, without rashes      LABS/STUDIES  --------------------------------------------------------------------------------              9.8    9.17  >-----------<  249      [02-18-20 @ 05:46]              31.5     139  |  102  |  56.0  ----------------------------<  340      [02-18-20 @ 05:46]  4.7   |  21.0  |  1.89        Ca     9.8     [02-18-20 @ 05:46]      Mg     2.1     [02-18-20 @ 05:46]    TPro  6.6  /  Alb  3.8  /  TBili  0.4  /  DBili  0.1  /  AST  26  /  ALT  33  /  AlkPhos  207  [02-18-20 @ 05:46]    PT/INR: PT 10.5 , INR 0.93       [02-16-20 @ 14:23]  PTT: 33.0       [02-16-20 @ 14:23]    Troponin <0.01      [02-16-20 @ 14:23]    Creatinine Trend:  SCr 1.89 [02-18 @ 05:46]  SCr 1.24 [02-17 @ 05:59]  SCr 1.07 [02-16 @ 14:23]      HbA1c 7.5      [02-17-20 @ 05:59]    HCV 0.15, Nonreact      [02-17-20 @ 14:52] [facility] DIVISION OF KIDNEY DISEASES AND HYPERTENSION -- INITIAL CONSULT NOTE  --------------------------------------------------------------------------------  HPI: This is 65 y/o female with hx of IDDM2, HTN, HLD, CKD who presents with SOB and b/l LE edema x5 days. Pt was on a cruise when she developed SÁNCHEZ, she was diagnosed with PNA by a cruise doctor and started on antibiotics. Her symptoms have not improved so she decided to come to the ER. Troponin negative x1, proBNP 704. LE dopplers negative for DVT. CTA negative for PE but showing pulmonary edema and small b/l pleural effusions.  EKG reveals ST with nonspecific T-wave abnormality. Pt states she had a cardiac cath at [place] 6 years ago and was told everything was normal. She does not have a cardiologist. LENY completed.  Ischemic work with nuclear stress in AM, npo after midnight.  BMP in am to monitor trend and re-evaluate Lasix and losartan usage.      Problem/Recommendation - 1:  Problem: Acute congestive heart failure, unspecified heart failure type. Recommendation: - Lasix on hold due to increasing bun/creatinine, nephrology consult pending.    -TTE in am  -daily weights  -low salt diet  -will need ischemic evaluation - nuclear stress testing in am  NPO past midnight     Problem/Recommendation - 2:  ·  Problem: Hypertension, unspecified type.  Recommendation: -pt states she's on Losartan and Lisinopril at home, will d/c Lisinopril and keep on Losartan 50 mg.  Pending bmp and neprhology consult pending.    - continue Coreg 3.125 mg bid.      Problem/Recommendation - 3:  ·  Problem: Hyperlipidemia, unspecified hyperlipidemia type.  Recommendation: -pt states she couldn't tolerate Lipitor and was placed on a different statin but doesn't remember name, will start on simvastatin 40 mg  -lipid panel.      Problem/Recommendation - 4:  ·  Problem: Type 2 diabetes mellitus with chronic kidney disease, with long-term current use of insulin, unspecified CKD stage.  Recommendation: management as per medicine  -Hgb A1C 7.5        PAST HISTORY  --------------------------------------------------------------------------------  PAST MEDICAL & SURGICAL HISTORY:  Dyslipidemia  CKD (chronic kidney disease)  Diabetes mellitus  Other and unspecified diseases of appendix    FAMILY HISTORY:  No pertinent family history in first degree relatives    PAST SOCIAL HISTORY:    ALLERGIES & MEDICATIONS  --------------------------------------------------------------------------------  Allergies  Levaquin (Unknown)    Intolerances      Standing Inpatient Medications  carvedilol 3.125 milliGRAM(s) Oral every 12 hours  dextrose 5%. 1000 milliLiter(s) IV Continuous <Continuous>  dextrose 50% Injectable 12.5 Gram(s) IV Push once  dextrose 50% Injectable 25 Gram(s) IV Push once  dextrose 50% Injectable 25 Gram(s) IV Push once  heparin  Injectable 5000 Unit(s) SubCutaneous every 8 hours  insulin glargine Injectable (LANTUS) 22 Unit(s) SubCutaneous at bedtime  insulin glargine Injectable (LANTUS) 20 Unit(s) SubCutaneous once  insulin lispro (HumaLOG) corrective regimen sliding scale   SubCutaneous three times a day before meals  insulin lispro Injectable (HumaLOG) 10 Unit(s) SubCutaneous three times a day before meals  losartan 50 milliGRAM(s) Oral daily  simvastatin 40 milliGRAM(s) Oral at bedtime    PRN Inpatient Medications  dextrose 40% Gel 15 Gram(s) Oral once PRN  glucagon  Injectable 1 milliGRAM(s) IntraMuscular once PRN      REVIEW OF SYSTEMS  --------------------------------------------------------------------------------  Gen: No weight changes, fatigue, fevers/chills, weakness  Skin: No rashes  Head/Eyes/Ears/Mouth: No headache; Normal hearing; Normal vision w/o blurriness; No sinus pain/discomfort, sore throat  Respiratory: No dyspnea, cough, wheezing, hemoptysis  CV: No chest pain, PND, orthopnea  GI: No abdominal pain, diarrhea, constipation, nausea, vomiting, melena, hematochezia  : No increased frequency, dysuria, hematuria, nocturia  MSK: No joint pain/swelling; no back pain; no edema  Neuro: No dizziness/lightheadedness, weakness, seizures, numbness, tingling  Heme: No easy bruising or bleeding  Endo: No heat/cold intolerance  Psych: No significant nervousness, anxiety, stress, depression    All other systems were reviewed and are negative, except as noted.    VITALS/PHYSICAL EXAM  --------------------------------------------------------------------------------  T(C): 36.6 (02-18-20 @ 06:36), Max: 36.7 (02-17-20 @ 15:35)  HR: 73 (02-18-20 @ 06:36) (73 - 84)  BP: 171/72 (02-18-20 @ 06:36) (168/74 - 171/72)  RR: 18 (02-18-20 @ 06:36) (18 - 18)  SpO2: 95% (02-18-20 @ 06:36) (95% - 98%)  Wt(kg): --  Height (cm): 152.4 (02-17-20 @ 22:30)  Weight (kg): 90.7 (02-16-20 @ 12:43)  BMI (kg/m2): 39.1 (02-17-20 @ 22:30)  BSA (m2): 1.87 (02-17-20 @ 22:30)      02-17-20 @ 07:01  -  02-18-20 @ 07:00  --------------------------------------------------------  IN: 480 mL / OUT: 900 mL / NET: -420 mL      Physical Exam:  	Gen: NAD, well-appearing  	HEENT:  supple neck  	Pulm: CTA B/L  	CV: RRR, S1S2; no rub  	Back: No spinal or CVA tenderness; no sacral edema  	Abd: +BS, soft, nontender/nondistended  	: No suprapubic tenderness  	UE: Warm, no edema; no asterixis  	LE: Warm, no edema  	Neuro: No focal deficits  	Psych: Normal affect and mood  	Skin: Warm, without rashes      LABS/STUDIES  --------------------------------------------------------------------------------              9.8    9.17  >-----------<  249      [02-18-20 @ 05:46]              31.5     139  |  102  |  56.0  ----------------------------<  340      [02-18-20 @ 05:46]  4.7   |  21.0  |  1.89        Ca     9.8     [02-18-20 @ 05:46]      Mg     2.1     [02-18-20 @ 05:46]    TPro  6.6  /  Alb  3.8  /  TBili  0.4  /  DBili  0.1  /  AST  26  /  ALT  33  /  AlkPhos  207  [02-18-20 @ 05:46]    PT/INR: PT 10.5 , INR 0.93       [02-16-20 @ 14:23]  PTT: 33.0       [02-16-20 @ 14:23]    Troponin <0.01      [02-16-20 @ 14:23]    Creatinine Trend:  SCr 1.89 [02-18 @ 05:46]  SCr 1.24 [02-17 @ 05:59]  SCr 1.07 [02-16 @ 14:23]      HbA1c 7.5      [02-17-20 @ 05:59]    HCV 0.15, Nonreact      [02-17-20 @ 14:52] [facility] DIVISION OF KIDNEY DISEASES AND HYPERTENSION -- INITIAL CONSULT NOTE  --------------------------------------------------------------------------------  HPI: This is 63 y/o female with hx of IDDM2, HTN, HLD, CKD who presents with SOB and b/l LE edema x5 days. Pt was on a cruise when she developed SÁNCHEZ, she was diagnosed with PNA by a cruise doctor and started on antibiotics. Her symptoms have not improved so she decided to come to the ER. Troponin negative x1, proBNP 704. LE dopplers negative for DVT. CTA negative for PE but showing pulmonary edema and small b/l pleural effusions.  EKG reveals ST with nonspecific T-wave abnormality. Pt states she had a cardiac cath at [place] 6 years ago and was told everything was normal. She does not have a cardiologist. LENY completed.          PAST HISTORY  --------------------------------------------------------------------------------  PAST MEDICAL & SURGICAL HISTORY:  Dyslipidemia  CKD (chronic kidney disease)  Diabetes mellitus  Other and unspecified diseases of appendix    FAMILY HISTORY:  No pertinent family history in first degree relatives    PAST SOCIAL HISTORY:    ALLERGIES & MEDICATIONS  --------------------------------------------------------------------------------  Allergies  Levaquin (Unknown)    Standing Inpatient Medications  carvedilol 3.125 milliGRAM(s) Oral every 12 hours  dextrose 5%. 1000 milliLiter(s) IV Continuous <Continuous>  dextrose 50% Injectable 12.5 Gram(s) IV Push once  dextrose 50% Injectable 25 Gram(s) IV Push once  dextrose 50% Injectable 25 Gram(s) IV Push once  heparin  Injectable 5000 Unit(s) SubCutaneous every 8 hours  insulin glargine Injectable (LANTUS) 22 Unit(s) SubCutaneous at bedtime  insulin glargine Injectable (LANTUS) 20 Unit(s) SubCutaneous once  insulin lispro (HumaLOG) corrective regimen sliding scale   SubCutaneous three times a day before meals  insulin lispro Injectable (HumaLOG) 10 Unit(s) SubCutaneous three times a day before meals  losartan 50 milliGRAM(s) Oral daily  simvastatin 40 milliGRAM(s) Oral at bedtime    PRN Inpatient Medications  dextrose 40% Gel 15 Gram(s) Oral once PRN  glucagon  Injectable 1 milliGRAM(s) IntraMuscular once PRN      REVIEW OF SYSTEMS  --------------------------------------------------------------------------------  Gen: No weight changes, fatigue, fevers/chills, weakness  Skin: No rashes  Head/Eyes/Ears/Mouth: No headache; Normal hearing; Normal vision w/o blurriness; No sinus pain/discomfort, sore throat  Respiratory: No dyspnea, cough, wheezing, hemoptysis  CV: No chest pain, PND, orthopnea  GI: No abdominal pain, diarrhea, constipation, nausea, vomiting, melena, hematochezia  : No increased frequency, dysuria, hematuria, nocturia  MSK: No joint pain/swelling; no back pain; no edema  Neuro: No dizziness/lightheadedness, weakness, seizures, numbness, tingling  Heme: No easy bruising or bleeding  Endo: No heat/cold intolerance  Psych: No significant nervousness, anxiety, stress, depression    All other systems were reviewed and are negative, except as noted.    VITALS/PHYSICAL EXAM  --------------------------------------------------------------------------------  T(C): 36.6 (20 @ 06:36), Max: 36.7 (20 @ 15:35)  HR: 73 (20 @ 06:36) (73 - 84)  BP: 171/72 (20 @ 06:36) (168/74 - 171/72)  RR: 18 (20 @ 06:36) (18 - 18)  SpO2: 95% (20 @ 06:36) (95% - 98%)  Wt(kg): --  Height (cm): 152.4 (20 @ 22:30)  Weight (kg): 90.7 (20 @ 12:43)  BMI (kg/m2): 39.1 (20 @ 22:30)  BSA (m2): 1.87 (20 @ 22:30)      20 @ 07:01  -  20 @ 07:00  --------------------------------------------------------  IN: 480 mL / OUT: 900 mL / NET: -420 mL      Physical Exam:  	Gen: NAD, well-appearing  	HEENT:  supple neck  	Pulm: CTA B/L  	CV: RRR, S1S2; no rub  	Back: No spinal or CVA tenderness; no sacral edema  	Abd: +BS, soft, nontender/nondistended  	: No suprapubic tenderness  	UE: Warm, no edema; no asterixis  	LE: Warm, no edema  	Neuro: No focal deficits  	Psych: Normal affect and mood  	Skin: Warm, without rashes      LABS/STUDIES  --------------------------------------------------------------------------------              9.8    9.17  >-----------<  249      [20 @ 05:46]              31.5     139  |  102  |  56.0  ----------------------------<  340      [20 05:46]  4.7   |  21.0  |  1.89        Ca     9.8     [20 05:46]      Mg     2.1     [20 05:46]    TPro  6.6  /  Alb  3.8  /  TBili  0.4  /  DBili  0.1  /  AST  26  /  ALT  33  /  AlkPhos  207  [20 05:46]    PT/INR: PT 10.5 , INR 0.93       [20 14:23]  PTT: 33.0       [20 14:23]    Troponin <0.01      [20 14:23]    Creatinine Trend:  SCr 1.89 [:46]  SCr 1.24 [ 05:59]  SCr 1.07 [:23]    HbA1c 7.5      [20 05:59]    HCV 0.15, Nonreact      [20 14:52]              EXAM:  ECHO TTE W CON COMPLETE W DOPP      PROCEDURE DATE:  2020     INTERPRETATION:  REPORT:    TRANSTHORACIC ECHOCARDIOGRAM REPORT    Patient Name:   CHARIS ROBERT Patient Location: Inpatient  Medical Rec #:  [id number]            Accession #:      [id number]  Account #:                          Height:           60.0 in 152.4 cm  YOB: 1955           Weight:           215.0 lb 97.52 kg  Patient Age:    64 years            BSA:              1.92 m²  Patient Gender: F                   BP:               160/70 mmHg    Date of Exam:        2020 2:45:40 PM  Sonographer:         Deysi Leach  Referring Physician: Selena Kenny    Procedure:     2D Echo/Doppler/Color Doppler Complete and Echocardiogram with                 Definity Contrast.  Indications:   Dyspnea, unspecified - R06.00  Diagnosis:     Pulmonary hypertension  Study Details: Technically difficult study. Study quality was adversely affected                 due to patient obesity, body habitus and lung interference.      2D AND M-MODE MEASUREMENTS (normal ranges within parentheses):  Left Ventricle:                  Normal   Aorta/Left Atrium:            Normal  IVSd (2D):              1.23 cm (0.7-1.1) LA Volume Index    27.7 ml/m²  LVPWd (2D):             1.18 cm (0.7-1.1) Right Ventricle:  LVIDd (2D):             4.33 cm (3.4-5.7) RVd (2D):        3.03 cm  LVIDs (2D):             2.51 cm           TAPSE:           1.97 cm  LV FS (2D):             42.0 %   (>25%)  Relative Wall Thickness  0.55    (<0.42)    LV SYSTOLIC FUNCTION BY 2D PLANIMETRY (MOD):  EF-A4C View: 76.3 % EF-A2C View: 57.3 % EF-Biplane: 68 %    LV DIASTOLIC FUNCTION:  MV Peak E: 1.23 m/s E/e' Ratio: 22.40  MV Peak A: 1.38 m/s Decel Time: 162 msec  E/A Ratio: 0.89    SPECTRAL DOPPLER ANALYSIS (where applicable):  Mitral Valve:  MV P1/2 Time: 46.98 msec  MV Area, PHT: 4.68 cm²    Aortic Valve: AoV Max Tj: 1.74 m/s AoV Peak P.1 mmHg AoV Mean P.0 mmHg    LVOT Vmax: 0.92 m/s LVOT VTI: 0.235 m LVOT Diameter: 1.79 cm    AoV Area, Vmax: 1.33 cm² AoV Area, VTI: 1.43 cm² AoV Area, Vmn: 1.32 cm²  Ao VTI: 0.413  Tricuspid Valve and PA/RV Systolic Pressure: TR Max Velocity: 3.46 m/s RA Pressure: 3 mmHg RVSP/PASP: 50.9 mmHg       PHYSICIAN INTERPRETATION:  Left Ventricle: Endocardial visualization was enhanced with intravenous echo contrast. The left ventricular internal cavity size is normal. Left ventricular wall thickness is mildly increased.  Global LV systolic function was normal. Left ventricular ejection fraction, by visual estimation, is 60 to 65%. Left ventricle chordal calcification. Flattening of the interventricular septum suggestive of right ventricle pressure and/or volume overload.  Right Ventricle: The right ventricular size is mildly enlarged. RV systolic function is normal.  Left Atrium: Normal left atrial size.  Right Atrium: Normal right atrial size.  Pericardium: Trivial pericardial effusion is present.  Mitral Valve: Mild thickening and calcification of the anterior and posterior mitral valve leaflets. Mild mitral valve regurgitation is seen.  Tricuspid Valve: The tricuspid valve is not well seen. Mild tricuspid regurgitation is visualized. Estimated pulmonary artery systolic pressure is 50.9 mmHg assuming a right atrial pressure of 3 mmHg, which is consistent with moderate pulmonary hypertension.  Aortic Valve: The aortic valve was not well visualized. Mild aortic stenosis (peak velocity 1.7 m/s, mean gradient 7 mmHg, calculated CAMMY by continuity equation 1.5 cm^2).  Pulmonic Valve: The pulmonic valve was not well visualized.  Aorta: The aortic root is normal in size and structure.  Venous: The inferior vena cava was normal sized, with respiratory size variation greater than 50%.     Summary:   1. Technically difficult study with poor endocardial visualization.   2. Endocardial visualization was enhanced with intravenous echo contrast.   3. Left ventricular ejection fraction, by visual estimation, is 60 to 65%.   4. Normal global left ventricular systolic function.   5. Mildly increased LV wall thickness.   6. Normal left ventricular internal cavity size.   7. Left ventricle chordal calcification. Flattening of the interventricular septum suggestive of right ventricle pressure and/or volume overload.   8. The right ventricle is mildly enlarged with normal systolic function.   9. Normal right atrial size.  10. Normal left atrial size.  11. Mild thickening and calcification of the anterior and posterior mitral valve leaflets.  12. Mild aortic stenosis (peak velocity 1.7 m/s, mean gradient 7 mmHg, calculated CAMMY by continuity equation 1.5 cm^2).  13. Estimated pulmonary artery systolic pressure is 50.9 mmHg assuming a right atrial pressure of 3 mmHg, which is consistent with moderate pulmonary hypertension.  14. Trivial pericardial effusion. [facility] DIVISION OF KIDNEY DISEASES AND HYPERTENSION -- INITIAL CONSULT NOTE  --------------------------------------------------------------------------------  HPI: This is 65 y/o female with hx of IDDM2, HTN, HLD, CKD who presents with SOB and b/l LE edema x5 days. Pt was on a cruise when she developed S[place]NCHE[place], she was diagnosed with PNA by a cruise doctor and started on antibiotics. Her symptoms have not improved so she decided to come to the ER. Troponin negative x1, proBNP 704. LE dopplers negative for DVT. CTA negative for PE but showing pulmonary edema and small b/l pleural effusions.  EKG reveals ST with nonspecific T-wave abnormality. Pt states she had a cardiac cath at [facility] 6 years ago and was told everything was normal. She does not have a cardiologist. LENY completed.          PAST HISTORY  --------------------------------------------------------------------------------  PAST MEDICAL & SURGICAL HISTORY:  Dyslipidemia  CKD (chronic kidney disease)  Diabetes mellitus  Other and unspecified diseases of appendix    FAMILY HISTORY:  No pertinent family history in first degree relatives    PAST SOCIAL HISTORY:    ALLERGIES & MEDICATIONS  --------------------------------------------------------------------------------  Allergies  Levaquin (Unknown)    Standing Inpatient Medications  carvedilol 3.125 milliGRAM(s) Oral every 12 hours  dextrose 5%. 1000 milliLiter(s) IV Continuous <Continuous>  dextrose 50% Injectable 12.5 Gram(s) IV Push once  dextrose 50% Injectable 25 Gram(s) IV Push once  dextrose 50% Injectable 25 Gram(s) IV Push once  heparin  Injectable 5000 Unit(s) SubCutaneous every 8 hours  insulin glargine Injectable (LANTUS) 22 Unit(s) SubCutaneous at bedtime  insulin glargine Injectable (LANTUS) 20 Unit(s) SubCutaneous once  insulin lispro (HumaLOG) corrective regimen sliding scale   SubCutaneous three times a day before meals  insulin lispro Injectable (HumaLOG) 10 Unit(s) SubCutaneous three times a day before meals  losartan 50 milliGRAM(s) Oral daily  simvastatin 40 milliGRAM(s) Oral at bedtime    PRN Inpatient Medications  dextrose 40% Gel 15 Gram(s) Oral once PRN  glucagon  Injectable 1 milliGRAM(s) IntraMuscular once PRN      REVIEW OF SYSTEMS  --------------------------------------------------------------------------------  Gen: No weight changes, fatigue, fevers/chills, weakness  Skin: No rashes  Head/Eyes/Ears/Mouth: No headache; Normal hearing; Normal vision w/o blurriness  Respiratory: + Dyspnea on exertion. No cough, wheezing, hemoptysis  CV: No chest pain. + LE edema  GI: No abdominal pain, diarrhea, constipation, nausea, vomiting, melena, hematochezia  : No increased frequency, dysuria, hematuria, nocturia  MSK: +back pain.  Neuro: No dizziness/lightheadedness, weakness, seizures, numbness, tingling  Heme: No easy bruising or bleeding  Endo: No heat/cold intolerance  Psych: No significant nervousness, anxiety, stress, depression    All other systems were reviewed and are negative, except as noted.    VITALS/PHYSICAL EXAM  --------------------------------------------------------------------------------  RAFAEL): 36.6 (20 @ 06:36), Max: 36.7 (20 @ 15:35)  HR: 73 (20 @ 06:36) (73 - 84)  BP: 171/72 (20 @ 06:36) (168/74 - 171/72)  RR: 18 (20 @ 06:36) (18 - 18)  SpO2: 95% (20 @ 06:36) (95% - 98%)    Height (cm): 152.4 (20 @ 22:30)  Weight (kg): 90.7 (20 @ 12:43)  BMI (kg/m2): 39.1 (20 @ 22:30)  BSA (m2): 1.87 (20 @ 22:30)      20 @ 07:01  -  20 @ 07:00  --------------------------------------------------------  IN: 480 mL / OUT: 900 mL / NET: -420 mL    Physical Exam:  	Gen: NAD, sitting up in chair  	HEENT: supple neck  	Pulm: fine crackles at lung bases B/L  	CV: RRR, S1S2; no rub  	Back: No spinal or CVA tenderness; no sacral edema  	Abd: +BS, soft, nontender/nondistended  	: No suprapubic tenderness  	UE: Warm, no edema; no asterixis  	LE: Warm, + edema bilaterally  	Neuro: No focal deficits  	Psych: Normal affect and mood  	Skin: Warm, without rashes      LABS/STUDIES  --------------------------------------------------------------------------------              9.8    9.17  >-----------<  249      [02-18-20 @ 05:46]              31.5     139  |  102  |  56.0  ----------------------------<  340      [20 05:46]  4.7   |  21.0  |  1.89        Ca     9.8     [20 05:46]      Mg     2.1     [20 05:46]    TPro  6.6  /  Alb  3.8  /  TBili  0.4  /  DBili  0.1  /  AST  26  /  ALT  33  /  AlkPhos  207  [20 05:46]    PT/INR: PT 10.5 , INR 0.93       [20 14:23]  PTT: 33.0       [20:23]    Troponin <0.01      [20:23]    Creatinine Trend:  SCr 1.89 [:46]  SCr 1.24 [:59]  SCr 1.07 [:23]    HbA1c 7.5      [20 05:59]    HCV 0.15, Nonreact      [20 14:52]          EXAM:  ECHO TTE W CON COMPLETE W DOPP      PROCEDURE DATE:  2020     INTERPRETATION:  REPORT:    TRANSTHORACIC ECHOCARDIOGRAM REPORT    Patient Name:   CHARIS ROBERT Patient Location: [facility] Rec #:  [id number]            Accession #:      [id number]  Account #:                          Height:           60.0 in 152.4 cm  YOB: 1955           Weight:           215.0 lb 97.52 kg  Patient Age:    64 years            BSA:              1.92 m²  Patient Gender: F                   BP:               160/70 mmHg    Date of Exam:        2020 2:45:40 PM  Sonographer:         Deysi Leach  Referring Physician: Selena Kenny    Procedure:     2D Echo/Doppler/Color Doppler Complete and Echocardiogram with                 Definity Contrast.  Indications:   Dyspnea, unspecified - R06.00  Diagnosis:     Pulmonary hypertension  Study Details: Technically difficult study. Study quality was adversely affected                 due to patient obesity, body habitus and lung interference.      2D AND M-MODE MEASUREMENTS (normal ranges within parentheses):  Left Ventricle:                  Normal   Aorta/Left Atrium:            Normal  IVSd (2D):              1.23 cm (0.7-1.1) LA Volume Index    27.7 ml/m²  LVPWd (2D):             1.18 cm (0.7-1.1) Right Ventricle:  LVIDd (2D):             4.33 cm (3.4-5.7) RVd (2D):        3.03 cm  LVIDs (2D):             2.51 cm           TAPSE:           1.97 cm  LV FS (2D):             42.0 %   (>25%)  Relative Wall Thickness  0.55    (<0.42)    LV SYSTOLIC FUNCTION BY 2D PLANIMETRY (MOD):  EF-A4C View: 76.3 % EF-A2C View: 57.3 % EF-Biplane: 68 %    LV DIASTOLIC FUNCTION:  MV Peak E: 1.23 m/s E/e' Ratio: 22.40  MV Peak A: 1.38 m/s Decel Time: 162 msec  E/A Ratio: 0.89    SPECTRAL DOPPLER ANALYSIS (where applicable):  Mitral Valve:  MV P1/2 Time: 46.98 msec  MV Area, PHT: 4.68 cm²    Aortic Valve: AoV Max Tj: 1.74 m/s AoV Peak P.1 mmHg AoV Mean P.0 mmHg    LVOT Vmax: 0.92 m/s LVOT VTI: 0.235 m LVOT Diameter: 1.79 cm    AoV Area, Vmax: 1.33 cm² AoV Area, VTI: 1.43 cm² AoV Area, Vmn: 1.32 cm²  Ao VTI: 0.413  Tricuspid Valve and PA/RV Systolic Pressure: TR Max Velocity: 3.46 m/s RA Pressure: 3 mmHg RVSP/PASP: 50.9 mmHg       PHYSICIAN INTERPRETATION:  Left Ventricle: Endocardial visualization was enhanced with intravenous echo contrast. The left ventricular internal cavity size is normal. Left ventricular wall thickness is mildly increased.  Global LV systolic function was normal. Left ventricular ejection fraction, by visual estimation, is 60 to 65%. Left ventricle chordal calcification. Flattening of the interventricular septum suggestive of right ventricle pressure and/or volume overload.  Right Ventricle: The right ventricular size is mildly enlarged. RV systolic function is normal.  Left Atrium: Normal left atrial size.  Right Atrium: Normal right atrial size.  Pericardium: Trivial pericardial effusion is present.  Mitral Valve: Mild thickening and calcification of the anterior and posterior mitral valve leaflets. Mild mitral valve regurgitation is seen.  Tricuspid Valve: The tricuspid valve is not well seen. Mild tricuspid regurgitation is visualized. Estimated pulmonary artery systolic pressure is 50.9 mmHg assuming a right atrial pressure of 3 mmHg, which is consistent with moderate pulmonary hypertension.  Aortic Valve: The aortic valve was not well visualized. Mild aortic stenosis (peak velocity 1.7 m/s, mean gradient 7 mmHg, calculated CAMMY by continuity equation 1.5 cm^2).  Pulmonic Valve: The pulmonic valve was not well visualized.  Aorta: The aortic root is normal in size and structure.  Venous: The inferior vena cava was normal sized, with respiratory size variation greater than 50%.     Summary:   1. Technically difficult study with poor endocardial visualization.   2. Endocardial visualization was enhanced with intravenous echo contrast.   3. Left ventricular ejection fraction, by visual estimation, is 60 to 65%.   4. Normal global left ventricular systolic function.   5. Mildly increased LV wall thickness.   6. Normal left ventricular internal cavity size.   7. Left ventricle chordal calcification. Flattening of the interventricular septum suggestive of right ventricle pressure and/or volume overload.   8. The right ventricle is mildly enlarged with normal systolic function.   9. Normal right atrial size.  10. Normal left atrial size.  11. Mild thickening and calcification of the anterior and posterior mitral valve leaflets.  12. Mild aortic stenosis (peak velocity 1.7 m/s, mean gradient 7 mmHg, calculated CAMMY by continuity equation 1.5 cm^2).  13. Estimated pulmonary artery systolic pressure is 50.9 mmHg assuming a right atrial pressure of 3 mmHg, which is consistent with moderate pulmonary hypertension.  14. Trivial pericardial effusion.

## 2020-02-18 NOTE — PROGRESS NOTE ADULT - ASSESSMENT
64 year old female with hx of DM , CKD morbid obesity is coming in with c/o SOB for 5 days ,     #. Acute HFpEF exacerbation. newly diagnosed CHF  - echo: EF 60-65%. moderate pulm HTN. Flattening of the interventricular septum suggestive of volume overload  - spoke to cardiology, nuclear stress test tomorrow 2/19  - HOLD lasix due to SETH  - strict Is and Os and daily weight  - low salt diet     # SETH on suspected CKD 3 possibly contrast induced SETH  - Cr on admission: 1.07->1.89 today 2/18  - appreciate nephro: hold lasix  - hold Losartan and nephrotoxic meds  - monitor BMP    # Hypertensive crises/ hypertensive emergency on admission.  BP persistently elevated (168/74- 171/72)  Coreg 3.125mg q12  hold losartan  add hydralazine 100mg q8    # Diarrhea  - monitor. maybe induced by recent antibiotics use  - follow up RVP panel    # DM2  - FS uncontrolled. Per pt, she takes Basaglar 10u q12 and then Lantus 22 u at bedtime. Humalog 10u pre-meal.  - will change regimen to Lantus 20u in morning, 22u at bedtime and lispro 10u pre-meal and ISS  monitor FS  Hba1c 7.4    #DLP  cont lipitor, patient does not recall her home dose.     #.Morbid obesity   lifestyle modification     #. DVT prophylaxis  heparin

## 2020-02-18 NOTE — PROGRESS NOTE ADULT - SUBJECTIVE AND OBJECTIVE BOX
[place] [facility]                                                        Office: [address] [address]                                                       Telephone: [phone]. Fax:[phone]                                                                             PROGRESS NOTE   Reason for follow up:  CHF - SOB                            Overnight: No new events.   Update:   LENY completed. Bun/creatinine with increase noted - 56/1/89 respectively.  Patient remains with + 3 pitting edema of LE.  Ischemic work with nuclear stress in AM, npo after midnight.  BMP in am to monitor trend and re-evaluate Lasix and losartan usage.   Subjective: "None"   Complains of:  Nothing  Review of symptoms: Cardiac:  No chest pain. No dyspnea. No palpitations.  Respiratory: no cough. No dyspnea  Gastrointestinal: No diarrhea. No abdominal pain. No bleeding.     Past medical history: No updates.   Chronic conditions:  HEALTH ISSUES - PROBLEM Dx:  Type 2 diabetes mellitus with chronic kidney disease, with long-term current use of insulin, unspecified CKD stage: Type 2 diabetes mellitus with chronic kidney disease, with long-term current use of insulin, unspecified CKD stage  Hyperlipidemia, unspecified hyperlipidemia type: Hyperlipidemia, unspecified hyperlipidemia type  Hypertension, unspecified type: Hypertension, unspecified type  Acute congestive heart failure, unspecified heart failure type: Acute congestive heart failure, unspecified heart failure type    Vitals:  T(C): 36.6 (02-18-20 @ 06:36), Max: 36.7 (02-17-20 @ 15:35)  HR: 73 (02-18-20 @ 06:36) (73 - 84)  BP: 171/72 (02-18-20 @ 06:36) (168/74 - 171/72)  RR: 18 (02-18-20 @ 06:36) (18 - 18)  SpO2: 95% (02-18-20 @ 06:36) (95% - 98%)    I&O's Summary  17 Feb 2020 07:01  -  18 Feb 2020 07:00  --------------------------------------------------------  IN: 480 mL / OUT: 900 mL / NET: -420 mL  Weight (kg): 90.7 (02-16 @ 12:43)    PHYSICAL EXAM:  Appearance: Comfortable. No acute distress  HEENT:  Head and neck: Atraumatic. Normocephalic.  Normal oral mucosa, PERRL, Neck is supple. No JVD, No carotid bruit.   Neurologic: A & O x 3, no focal deficits. EOMI , Cranial nerves are intact.  Lymphatic: No cervical lymphadenopathy  Cardiovascular: Normal S1 S2, No murmur, rubs/gallops. No JVD, No edema  Respiratory: Lungs clear to auscultation, diminished bilateral bases.    Gastrointestinal:  Soft, Non-tender, + BS, obese, distended  Lower Extremities: +3 edema up to knees   Psychiatry: Patient is calm. No agitation. Mood & affect appropriate  Skin: No rashes/ ecchymoses/cyanosis/ulcers visualized on the face, hands or feet.    CURRENT MEDICATIONS:  carvedilol 3.125 milliGRAM(s) Oral every 12 hours  losartan 50 milliGRAM(s) Oral daily  insulin glargine Injectable (LANTUS)  insulin lispro (HumaLOG) corrective regimen sliding scale  insulin lispro Injectable (HumaLOG)  simvastatin  heparin  Injectable    LABS:	 	  CARDIAC MARKERS ( 16 Feb 2020 15:01 )  x     / x     / x     / x     / x      p-BNP 16 Feb 2020 15:01: 704 pg/mL, CARDIAC MARKERS ( 16 Feb 2020 14:23 )  x     / <0.01 ng/mL / x     / x     / x      p-BNP 16 Feb 2020 14:23: x                    9.8    9.17  )-----------( 249      ( 18 Feb 2020 05:46 )             31.5   02-18  139  |  102  |  56.0<H>  ----------------------------<  340<H>  4.7   |  21.0<L>  |  1.89<H>  Ca    9.8      18 Feb 2020 05:46  Mg     2.1     02-18  TPro  6.6  /  Alb  3.8  /  TBili  0.4  /  DBili  0.1  /  AST  26  /  ALT  33<H>  /  AlkPhos  207<H>  02-18  proBNP: Serum Pro-Brain Natriuretic Peptide: 704 pg/mL (02-16 @ 15:01)  HgA1c: Hemoglobin A1C, Whole Blood: 7.5 %    TELEMETRY: Reviewed, SR 60's, de-sating overnight.    ECG:  Reviewed by me. 	    Summary:   1. Technically difficult study with poor endocardial visualization.   2. Endocardial visualization was enhanced with intravenous echo contrast.   3. Left ventricular ejection fraction, by visual estimation, is 60 to 65%.   4. Normal global left ventricular systolic function.   5. Mildly increased LV wall thickness.   6. Normal left ventricular internal cavity size.   7. Left ventricle chordal calcification. Flattening of the interventricular septum suggestive of right ventricle pressure and/or volume overload.   8. The right ventricle is mildly enlarged with normal systolic function.   9. Normal right atrial size.  10. Normal left atrial size.  11. Mild thickening and calcification of the anterior and posterior mitral valve leaflets.  12. Mild aortic stenosis (peak velocity 1.7 m/s, mean gradient 7 mmHg, calculated CAMMY by continuity equation 1.5 cm^2).  13. Estimated pulmonary artery systolic pressure is 50.9 mmHg assuming a right atrial pressure of 3 mmHg, which is consistent with moderate pulmonary hypertension.  14. Trivial pericardial effusion.

## 2020-02-18 NOTE — CONSULT NOTE ADULT - ASSESSMENT
Acute Kidney Injury    Creatinine Trend:  SCr 1.89 [02-18 @ 05:46]  SCr 1.24 [02-17 @ 05:59]  SCr 1.07 [02-16 @ 14:23]      KDIGO-Based Care Bundle for patients with elevated urinary [TIMP-2]*[IGFBP7] led to fewer stage 2 and 3 AKIs and shorter ICU and hospital length stays    KDIGO Care Bundle:  Avoidance of nephrotoxins/nephrotoxin medication adjustment  Medication dosage adjustment for kidney function  Continuous IVF administration (guided by CVP and testing of fluid responsiveness for 6 hours in combination with nephrology consultation)  Discontinuation of ACE/ARBs for first 48 postoperative hours  Close monitoring of serum Creatinine and UO  Acid-base, electrolyte and albumin status management  Avoidance of hyperglycemia for first 72 postoperative hours  Consider alternatives to radiocontrast administration  Optimizing hemodynamics Acute Kidney Injury    Creatinine Trend:  SCr 1.89 [02-18 @ 05:46]  SCr 1.24 [02-17 @ 05:59]  SCr 1.07 [02-16 @ 14:23]    CHF      KDIGO-Based Care Bundle for patients with elevated urinary [TIMP-2]*[IGFBP7] led to fewer stage 2 and 3 AKIs and shorter ICU and hospital length stays    KDIGO Care Bundle:  Avoidance of nephrotoxins/nephrotoxin medication adjustment  Medication dosage adjustment for kidney function  Continuous IVF administration (guided by CVP and testing of fluid responsiveness for 6 hours in combination with nephrology consultation)  Discontinuation of ACE/ARBs for first 48 postoperative hours  Close monitoring of serum Creatinine and UO  Acid-base, electrolyte and albumin status management  Avoidance of hyperglycemia for first 72 postoperative hours  Consider alternatives to radiocontrast administration  Optimizing hemodynamics 1. Acute Kidney Injury on CKD    Creatinine Trend:  SCr 1.89 [02-18 @ 05:46]  SCr 1.24 [02-17 @ 05:59]  SCr 1.07 [02-16 @ 14:23]    -Renal ultrasound  -UA    KDIGO-Based Care Bundle for patients with elevated urinary [TIMP-2]*[IGFBP7] led to fewer stage 2 and 3 AKIs and shorter ICU and hospital length stays    KDIGO Care Bundle:  Avoidance of nephrotoxins/nephrotoxin medication adjustment  Medication dosage adjustment for kidney function  Continuous IVF administration (guided by CVP and testing of fluid responsiveness for 6 hours in combination with nephrology consultation)  Discontinuation of ACE/ARBs for first 48 postoperative hours  Close monitoring of serum Creatinine and UO  Acid-base, electrolyte and albumin status management  Avoidance of hyperglycemia for first 72 postoperative hours  Consider alternatives to radiocontrast administration  Optimizing hemodynamics    start IVF    2. Acute congestive heart failure, unspecified heart failure type  -TTE in am  -daily weights, low salt diet  -nuclear stress testing in am    3. Hypertension, unspecified type  Recommendation: -pt states she's on Losartan and Lisinopril at home- d/c Lisinopril and keep on Losartan 50 mg.   continue Coreg 3.125 mg bid  increase medication dose?    4. Hyperglycemia - glucose in 300s from BMP 1. Acute Kidney Injury on CKD    Creatinine Trend:  SCr 1.89 [02-18 @ 05:46]  SCr 1.24 [02-17 @ 05:59]  SCr 1.07 [02-16 @ 14:23]    -Renal ultrasound  -UA    CT angio of hest with IV contrast on 02/16/20        KDIGO-Based Care Bundle for patients with elevated urinary [TIMP-2]*[IGFBP7] led to fewer stage 2 and 3 AKIs and shorter ICU and hospital length stays    KDIGO Care Bundle:  Avoidance of nephrotoxins/nephrotoxin medication adjustment  Medication dosage adjustment for kidney function  Continuous IVF administration (guided by CVP and testing of fluid responsiveness for 6 hours in combination with nephrology consultation)  Discontinuation of ACE/ARBs for first 48 postoperative hours  Close monitoring of serum Creatinine and UO  Acid-base, electrolyte and albumin status management  Avoidance of hyperglycemia for first 72 postoperative hours  Consider alternatives to radiocontrast administration  Optimizing hemodynamics    start IVF    2. Acute congestive heart failure, unspecified heart failure type  -TTE in am  -daily weights, low salt diet  -nuclear stress testing in am    3. Hypertension, unspecified type  Recommendation: -pt states she's on Losartan and Lisinopril at home- d/c Lisinopril and keep on Losartan 50 mg.   continue Coreg 3.125 mg bid  increase medication dose?    4. Hyperglycemia - glucose in 300s from BMP 1. Acute Kidney Injury on CKD    Creatinine Trend:  SCr 1.89 [02-18 @ 05:46]  SCr 1.24 [02-17 @ 05:59]  SCr 1.07 [02-16 @ 14:23]    -Renal ultrasound  -UA    CT angio of chest with IV contrast on 02/16/20    KDIGO-Based Care Bundle for patients with elevated urinary [TIMP-2]*[IGFBP7] led to fewer stage 2 and 3 AKIs and shorter ICU and hospital length stays    KDIGO Care Bundle:  Avoidance of nephrotoxins/nephrotoxin medication adjustment  Medication dosage adjustment for kidney function  Continuous IVF administration (guided by CVP and testing of fluid responsiveness for 6 hours in combination with nephrology consultation)  Discontinuation of ACE/ARBs for first 48 postoperative hours  Close monitoring of serum Creatinine and UO  Acid-base, electrolyte and albumin status management  Avoidance of hyperglycemia for first 72 postoperative hours  Consider alternatives to radiocontrast administration  Optimizing hemodynamics    start IVF ??    2. Acute congestive heart failure, unspecified heart failure type  -TTE in am  -daily weights, low salt diet  -nuclear stress testing in am    3. Hypertension, unspecified type  Recommendation: -pt states she's on Losartan and Lisinopril at home- d/c Lisinopril and keep on Losartan 50 mg.   continue Coreg 3.125 mg bid  increase medication dose?    4. Hyperglycemia - glucose in 300s from BMP 1. Acute Kidney Injury on CKD (sees Nephrologist Genaro in [place] )    Creatinine Trend:  SCr 1.89 [02-18 @ 05:46]  SCr 1.24 [02-17 @ 05:59]  SCr 1.07 [02-16 @ 14:23]    -Renal ultrasound  -UA, urine prot/creat ratio  -serum phos, PTH ordered    -lassix?    CT angio of chest with IV contrast on 02/16/20    KDIGO-Based Care Bundle for patients with elevated urinary [TIMP-2]*[IGFBP7] led to fewer stage 2 and 3 AKIs and shorter ICU and hospital length stays    KDIGO Care Bundle:  Avoidance of nephrotoxins/nephrotoxin medication adjustment  Medication dosage adjustment for kidney function  Continuous IVF administration (guided by CVP and testing of fluid responsiveness for 6 hours in combination with nephrology consultation)  Discontinuation of ACE/ARBs for first 48 postoperative hours  Close monitoring of serum Creatinine and UO  Acid-base, electrolyte and albumin status management  Avoidance of hyperglycemia for first 72 postoperative hours  Consider alternatives to radiocontrast administration  Optimizing hemodynamics    2. Anemia  -Anemia work-up    3. Acute congestive heart failure, unspecified heart failure type  -TTE in am  -daily weights, low salt diet  -nuclear stress testing in am    4. Hypertension, unspecified type  Recommendation: -pt states she's on Losartan and Lisinopril at home- d/c Lisinopril and keep on Losartan 50 mg.   continue Coreg 3.125 mg bid    4. Hyperglycemia - glucose in 300s from BMP 1. Acute Kidney Injury on CKD (sees Nephrologist Genaro in [place] )    Creatinine Trend:  SCr 1.89 [02-18 @ 05:46]  SCr 1.24 [02-17 @ 05:59]  SCr 1.07 [02-16 @ 14:23]    -Renal ultrasound  -UA, urine prot/creat ratio  -serum phos, PTH ordered    - CXR Now neg., hold lasix for now    CT angio of chest with IV contrast on 02/16/20    KDIGO-Based Care Bundle for patients with elevated urinary [TIMP-2]*[IGFBP7] led to fewer stage 2 and 3 AKIs and shorter ICU and hospital length stays    KDIGO Care Bundle:  Avoidance of nephrotoxins/nephrotoxin medication adjustment  Medication dosage adjustment for kidney function  Continuous IVF administration (guided by CVP and testing of fluid responsiveness for 6 hours in combination with nephrology consultation)  Discontinuation of ACE/ARBs for first 48 postoperative hours  Close monitoring of serum Creatinine and UO  Acid-base, electrolyte and albumin status management  Avoidance of hyperglycemia for first 72 postoperative hours  Consider alternatives to radiocontrast administration  Optimizing hemodynamics    2. Anemia  -Anemia work-up    3. Acute congestive heart failure, unspecified heart failure type  -TTE in am  -daily weights, low salt diet  -nuclear stress testing in am    4. Hypertension, unspecified type  Recommendation: -pt states she's on Losartan and Lisinopril at home- d/c Lisinopril and keep on Losartan 50 mg.   continue Coreg 3.125 mg bid    4. Hyperglycemia - glucose in 300s from BMP      Will evaluate in the next 24 Hours,     Contrast-Induced SETH  Contrast-induced SETH (CI-SETH, also referred to as contrast-associated SETH) is a specific form of SETH that usually manifests as a transient small increase in Scr concentration within a few days of exposure to intravascular iodinated contrast. Despite its usually self-limited course, CI-SETH is associated with increased short- and long-term mortality, as well as progressive CKD. Recently, the degree to which radiocontrast affects the kidney has been debated because several studies (both meta-analyses and cohort studies) have suggested that in the aggregate population, the risk for SETH after contrast administration is perhaps overemphasized.    Nonetheless, in clinical practice, for any given study requiring iodinated contrast, the potential risks and benefits should be weighed closely. Along the same lines, patient- and procedure-level factors contribute to the risk for CI-SETH and should be assessed. The primary risk factor for CI-SETH is CKD, and the incidence of CI-SETH increases incrementally as GFR decreases or proteinuria/albuminuria increases. Diabetes further increases the risk in those with CKD. Additional patient-specific risk factors include low effective circulating blood volume and nonsteroidal anti-inflammatory drug use. Procedure-related risk factors include higher contrast volume, intra-arterial procedures, multiple contrast exposures in a short interval, and hyperosmolar contrast agents.    Management of CI-SETH aims primarily at prevention. Consideration should be given to alternative noncontrast studies if possible. Those who undergo iodinated contrast studies should have treatment with nonsteroidal anti-inflammatory drugs and other nephrotoxins discontinued, ideally at least 24 hours before the procedure. Low- or iso-osmolar radiocontrast should be used, at the lowest possible volume required. Isotonic intravenous fluid administration reduces the risk for CI-SETH and should be used in those at elevated risk. Typical regimens consist of a 1-mL/kg/h infusion 12 hours before and 12 hours after contrast exposure, or 3 mL/kg/h 1 hour before and 1.5 mL/kg/h for 4 to 6 hours postprocedure. With regard to fluid selection, although small studies suggested a benefit to the use of isotonic sodium bicarbonate solution, a large randomized clinical trial of isotonic bicarbonate versus normal saline solution (factorialized with N-acetylcysteine vs placebo) in high-risk patients undergoing angiography showed no benefit with bicarbonate or N-acetylcysteine with regard to a composite end point of death, RRT, and 50% reduction in GFR at 90 days. There have been a variety of other pharmacotherapies evaluated for CI-SETH prevention, none of which is clearly beneficial. Hemodialysis after administration of contrast is ineffective for preventing CI-SETH and may cause harm.:    I was Physically Present for the key portions of the Evaluation & management ( E/M ) Service provided.    I agree with the above History  , Physical examination & Treatment Plans,    I have Reviewed , Modified or appended where appropriate,

## 2020-02-18 NOTE — PROGRESS NOTE ADULT - SUBJECTIVE AND OBJECTIVE BOX
Patient is a 64y old  Female who presents with a chief complaint of SOB (18 Feb 2020 12:08)      Patient seen and examined at bedside. No overnight events reported. Pt reports shortness of breath on ambulation, no symptoms at rest. no cp. + non- bloody diarrhea for two days    ALLERGIES:  Levaquin (Unknown)    MEDICATIONS  (STANDING):  carvedilol 3.125 milliGRAM(s) Oral every 12 hours  dextrose 5%. 1000 milliLiter(s) (50 mL/Hr) IV Continuous <Continuous>  dextrose 50% Injectable 12.5 Gram(s) IV Push once  dextrose 50% Injectable 25 Gram(s) IV Push once  dextrose 50% Injectable 25 Gram(s) IV Push once  heparin  Injectable 5000 Unit(s) SubCutaneous every 8 hours  insulin glargine Injectable (LANTUS) 22 Unit(s) SubCutaneous at bedtime  insulin lispro (HumaLOG) corrective regimen sliding scale   SubCutaneous three times a day before meals  insulin lispro Injectable (HumaLOG) 10 Unit(s) SubCutaneous three times a day before meals  insulin lispro Injectable (HumaLOG) 12 Unit(s) SubCutaneous once  losartan 50 milliGRAM(s) Oral daily  simvastatin 40 milliGRAM(s) Oral at bedtime    MEDICATIONS  (PRN):  dextrose 40% Gel 15 Gram(s) Oral once PRN Blood Glucose LESS THAN 70 milliGRAM(s)/deciliter  glucagon  Injectable 1 milliGRAM(s) IntraMuscular once PRN Glucose LESS THAN 70 milligrams/deciliter    Vital Signs Last 24 Hrs  T(F): 97.8 (18 Feb 2020 06:36), Max: 98.1 (17 Feb 2020 15:35)  HR: 73 (18 Feb 2020 06:36) (73 - 84)  BP: 171/72 (18 Feb 2020 06:36) (168/74 - 171/72)  RR: 18 (18 Feb 2020 06:36) (18 - 18)  SpO2: 95% (18 Feb 2020 06:36) (95% - 98%)  I&O's Summary    17 Feb 2020 07:01  -  18 Feb 2020 07:00  --------------------------------------------------------  IN: 480 mL / OUT: 900 mL / NET: -420 mL      GENERAL: NAD  HEAD:  Atraumatic, Normocephalic  EYES: EOMI, PERRLA, sclera clear  NECK: Supple  CHEST/LUNG: Clear to auscultation bilaterally; No wheeze  HEART: Regular rate and rhythm; No murmurs, rubs, or gallops  ABDOMEN: Soft, Nontender, Nondistended; Bowel sounds present  EXTREMITIES: No clubbing, cyanosis. + 1 pitting edema L>R  NEUROLOGY: non-focal  SKIN: No rashes    LABS:                        9.8    9.17  )-----------( 249      ( 18 Feb 2020 05:46 )             31.5     02-18    139  |  102  |  56.0  ----------------------------<  340  4.7   |  21.0  |  1.89    Ca    9.8      18 Feb 2020 05:46  Mg     2.1     02-18    TPro  6.6  /  Alb  3.8  /  TBili  0.4  /  DBili  0.1  /  AST  26  /  ALT  33  /  AlkPhos  207  02-18      eGFR if Non African American: 28 mL/min/1.73M2 (02-18-20 @ 05:46)  eGFR if African American: 32 mL/min/1.73M2 (02-18-20 @ 05:46)    PT/INR - ( 16 Feb 2020 14:23 )   PT: 10.5 sec;   INR: 0.93 ratio         PTT - ( 16 Feb 2020 14:23 )  PTT:33.0 sec      CARDIAC MARKERS ( 16 Feb 2020 14:23 )  x     / <0.01 ng/mL / x     / x     / x          POCT Blood Glucose.: 419 mg/dL (18 Feb 2020 14:18)  POCT Blood Glucose.: 277 mg/dL (18 Feb 2020 12:55)  POCT Blood Glucose.: 363 mg/dL (18 Feb 2020 08:29)  POCT Blood Glucose.: 329 mg/dL (17 Feb 2020 22:57)  POCT Blood Glucose.: 315 mg/dL (17 Feb 2020 16:58)  POCT Blood Glucose.: 349 mg/dL (17 Feb 2020 15:24)    02-17 [id number] 7.5      RADIOLOGY & ADDITIONAL TESTS:    Care Discussed with Consultants/Other Providers: discussed with cardiology PA

## 2020-02-19 LAB
ANION GAP SERPL CALC-SCNC: 16 MMOL/L — SIGNIFICANT CHANGE UP (ref 5–17)
BASOPHILS # BLD AUTO: 0.03 K/UL — SIGNIFICANT CHANGE UP (ref 0–0.2)
BASOPHILS NFR BLD AUTO: 0.4 % — SIGNIFICANT CHANGE UP (ref 0–2)
BUN SERPL-MCNC: 64 MG/DL — HIGH (ref 8–20)
CALCIUM SERPL-MCNC: 10 MG/DL — SIGNIFICANT CHANGE UP (ref 8.4–10.5)
CALCIUM SERPL-MCNC: 9.9 MG/DL — SIGNIFICANT CHANGE UP (ref 8.6–10.2)
CHLORIDE SERPL-SCNC: 103 MMOL/L — SIGNIFICANT CHANGE UP (ref 98–107)
CO2 SERPL-SCNC: 22 MMOL/L — SIGNIFICANT CHANGE UP (ref 22–29)
CREAT SERPL-MCNC: 1.98 MG/DL — HIGH (ref 0.5–1.3)
EOSINOPHIL # BLD AUTO: 0.39 K/UL — SIGNIFICANT CHANGE UP (ref 0–0.5)
EOSINOPHIL NFR BLD AUTO: 4.9 % — SIGNIFICANT CHANGE UP (ref 0–6)
FERRITIN SERPL-MCNC: 60 NG/ML — SIGNIFICANT CHANGE UP (ref 15–150)
GLUCOSE BLDC GLUCOMTR-MCNC: 145 MG/DL — HIGH (ref 70–99)
GLUCOSE BLDC GLUCOMTR-MCNC: 151 MG/DL — HIGH (ref 70–99)
GLUCOSE BLDC GLUCOMTR-MCNC: 184 MG/DL — HIGH (ref 70–99)
GLUCOSE BLDC GLUCOMTR-MCNC: 187 MG/DL — HIGH (ref 70–99)
GLUCOSE BLDC GLUCOMTR-MCNC: 205 MG/DL — HIGH (ref 70–99)
GLUCOSE BLDC GLUCOMTR-MCNC: 251 MG/DL — HIGH (ref 70–99)
GLUCOSE SERPL-MCNC: 129 MG/DL — HIGH (ref 70–99)
HCG SERPL QL: NEGATIVE — SIGNIFICANT CHANGE UP
HCT VFR BLD CALC: 32 % — LOW (ref 34.5–45)
HGB BLD-MCNC: 9.8 G/DL — LOW (ref 11.5–15.5)
IMM GRANULOCYTES NFR BLD AUTO: 0.5 % — SIGNIFICANT CHANGE UP (ref 0–1.5)
IRON SATN MFR SERPL: 15 % — SIGNIFICANT CHANGE UP (ref 14–50)
IRON SATN MFR SERPL: 60 UG/DL — SIGNIFICANT CHANGE UP (ref 37–145)
LYMPHOCYTES # BLD AUTO: 1.27 K/UL — SIGNIFICANT CHANGE UP (ref 1–3.3)
LYMPHOCYTES # BLD AUTO: 16 % — SIGNIFICANT CHANGE UP (ref 13–44)
MAGNESIUM SERPL-MCNC: 2.2 MG/DL — SIGNIFICANT CHANGE UP (ref 1.6–2.6)
MCHC RBC-ENTMCNC: 28.6 PG — SIGNIFICANT CHANGE UP (ref 27–34)
MCHC RBC-ENTMCNC: 30.6 GM/DL — LOW (ref 32–36)
MCV RBC AUTO: 93.3 FL — SIGNIFICANT CHANGE UP (ref 80–100)
MONOCYTES # BLD AUTO: 0.74 K/UL — SIGNIFICANT CHANGE UP (ref 0–0.9)
MONOCYTES NFR BLD AUTO: 9.3 % — SIGNIFICANT CHANGE UP (ref 2–14)
NEUTROPHILS # BLD AUTO: 5.46 K/UL — SIGNIFICANT CHANGE UP (ref 1.8–7.4)
NEUTROPHILS NFR BLD AUTO: 68.9 % — SIGNIFICANT CHANGE UP (ref 43–77)
PHOSPHATE SERPL-MCNC: 5.7 MG/DL — HIGH (ref 2.4–4.7)
PLATELET # BLD AUTO: 264 K/UL — SIGNIFICANT CHANGE UP (ref 150–400)
POTASSIUM SERPL-MCNC: 4.4 MMOL/L — SIGNIFICANT CHANGE UP (ref 3.5–5.3)
POTASSIUM SERPL-SCNC: 4.4 MMOL/L — SIGNIFICANT CHANGE UP (ref 3.5–5.3)
PTH-INTACT FLD-MCNC: 57 PG/ML — SIGNIFICANT CHANGE UP (ref 15–65)
RAPID RVP RESULT: SIGNIFICANT CHANGE UP
RBC # BLD: 3.43 M/UL — LOW (ref 3.8–5.2)
RBC # FLD: 13.2 % — SIGNIFICANT CHANGE UP (ref 10.3–14.5)
SODIUM SERPL-SCNC: 141 MMOL/L — SIGNIFICANT CHANGE UP (ref 135–145)
TIBC SERPL-MCNC: 388 UG/DL — SIGNIFICANT CHANGE UP (ref 220–430)
TRANSFERRIN SERPL-MCNC: 271 MG/DL — SIGNIFICANT CHANGE UP (ref 192–382)
WBC # BLD: 7.93 K/UL — SIGNIFICANT CHANGE UP (ref 3.8–10.5)
WBC # FLD AUTO: 7.93 K/UL — SIGNIFICANT CHANGE UP (ref 3.8–10.5)

## 2020-02-19 PROCEDURE — 99232 SBSQ HOSP IP/OBS MODERATE 35: CPT

## 2020-02-19 PROCEDURE — 78452 HT MUSCLE IMAGE SPECT MULT: CPT | Mod: 26

## 2020-02-19 PROCEDURE — 99233 SBSQ HOSP IP/OBS HIGH 50: CPT

## 2020-02-19 PROCEDURE — 93016 CV STRESS TEST SUPVJ ONLY: CPT

## 2020-02-19 PROCEDURE — 93018 CV STRESS TEST I&R ONLY: CPT

## 2020-02-19 PROCEDURE — 76775 US EXAM ABDO BACK WALL LIM: CPT | Mod: 26

## 2020-02-19 RX ORDER — IRON SUCROSE 20 MG/ML
100 INJECTION, SOLUTION INTRAVENOUS EVERY 24 HOURS
Refills: 0 | Status: DISCONTINUED | OUTPATIENT
Start: 2020-02-19 | End: 2020-02-21

## 2020-02-19 RX ORDER — INSULIN GLARGINE 100 [IU]/ML
20 INJECTION, SOLUTION SUBCUTANEOUS AT BEDTIME
Refills: 0 | Status: DISCONTINUED | OUTPATIENT
Start: 2020-02-19 | End: 2020-02-21

## 2020-02-19 RX ORDER — CARVEDILOL PHOSPHATE 80 MG/1
6.25 CAPSULE, EXTENDED RELEASE ORAL EVERY 12 HOURS
Refills: 0 | Status: DISCONTINUED | OUTPATIENT
Start: 2020-02-19 | End: 2020-02-21

## 2020-02-19 RX ADMIN — Medication 100 MILLIGRAM(S): at 05:30

## 2020-02-19 RX ADMIN — Medication 100 MILLIGRAM(S): at 21:49

## 2020-02-19 RX ADMIN — IRON SUCROSE 210 MILLIGRAM(S): 20 INJECTION, SOLUTION INTRAVENOUS at 14:42

## 2020-02-19 RX ADMIN — Medication 10 UNIT(S): at 14:41

## 2020-02-19 RX ADMIN — CARVEDILOL PHOSPHATE 6.25 MILLIGRAM(S): 80 CAPSULE, EXTENDED RELEASE ORAL at 17:42

## 2020-02-19 RX ADMIN — SIMVASTATIN 40 MILLIGRAM(S): 20 TABLET, FILM COATED ORAL at 21:49

## 2020-02-19 RX ADMIN — Medication 10 UNIT(S): at 17:42

## 2020-02-19 RX ADMIN — Medication 650 MILLIGRAM(S): at 20:03

## 2020-02-19 RX ADMIN — HEPARIN SODIUM 5000 UNIT(S): 5000 INJECTION INTRAVENOUS; SUBCUTANEOUS at 21:49

## 2020-02-19 RX ADMIN — Medication 3: at 14:42

## 2020-02-19 RX ADMIN — HEPARIN SODIUM 5000 UNIT(S): 5000 INJECTION INTRAVENOUS; SUBCUTANEOUS at 14:41

## 2020-02-19 RX ADMIN — HEPARIN SODIUM 5000 UNIT(S): 5000 INJECTION INTRAVENOUS; SUBCUTANEOUS at 05:30

## 2020-02-19 RX ADMIN — Medication 1: at 17:42

## 2020-02-19 RX ADMIN — CARVEDILOL PHOSPHATE 3.12 MILLIGRAM(S): 80 CAPSULE, EXTENDED RELEASE ORAL at 05:30

## 2020-02-19 RX ADMIN — Medication 100 MILLIGRAM(S): at 20:07

## 2020-02-19 RX ADMIN — Medication 650 MILLIGRAM(S): at 21:00

## 2020-02-19 RX ADMIN — INSULIN GLARGINE 20 UNIT(S): 100 INJECTION, SOLUTION SUBCUTANEOUS at 22:07

## 2020-02-19 RX ADMIN — Medication 100 MILLIGRAM(S): at 14:41

## 2020-02-19 NOTE — PROGRESS NOTE ADULT - ASSESSMENT
64 year old female with hx of DM2 , CKD3 morbid obesity presents for shortness of breath    acute diastolic HF:     - echo: EF 60-65%. moderate pulm HTN. Flattening of the interventricular septum suggestive of volume overload  - spoke to cardiology, nuclear stress test 2/19  - HOLD lasix due to SETH    # SETH on suspected CKD 3 possibly contrast induced SETH  - nephro following : hold lasix  - hold Losartan and nephrotoxic meds  - monitor BMP    # Hypertensive crises/ hypertensive emergency on admission.  BP persistently elevated (168/74- 171/72)  increase coreg to 6.25BID  hold losartan  add hydralazine 100mg q8      # DM2  - Per pt, she takes Basaglar 10u q12 and then Lantus 22 u at bedtime. Humalog 10u pre-meal.  - will change regimen to Lantus 20u in morning, 22u at bedtime and lispro 10u pre-meal and ISS  monitor FS  Hba1c 7.4    #DLP  cont lipitor, patient does not recall her home dose.     #.Morbid obesity   lifestyle modification     #. DVT prophylaxis  heparin

## 2020-02-19 NOTE — PROGRESS NOTE ADULT - SUBJECTIVE AND OBJECTIVE BOX
CARDIOLOGY PROGRESS NOTE   ([facility])                                                                                                        Subjective:     Vitals:  T(C): 36.7 (02-19-20 @ 15:28), Max: 36.7 (02-18-20 @ 21:45)  HR: 70 (02-19-20 @ 17:47) (70 - 81)  BP: 160/58 (02-19-20 @ 17:47) (143/65 - 183/85)  RR: 18 (02-19-20 @ 15:28) (17 - 18)  SpO2: 96% (02-19-20 @ 15:28) (94% - 96%)  Wt(kg): --  I&O's Summary    18 Feb 2020 07:01  -  19 Feb 2020 07:00  --------------------------------------------------------  IN: 240 mL / OUT: 200 mL / NET: 40 mL          PHYSICAL EXAM:  Appearance: Normal	  HEENT:   Atraumatic  Cardiovascular: Normal S1 S2, No JVD, No murmurs, No edema  Respiratory: Lungs clear to auscultation	  Gastrointestinal:  Soft, Non-tender, + BS	  Skin: No rashes, No ecchymoses, No cyanosis  Neurologic: Alert and awake, able to move extremities  Extremities: No edema    TELEMETRY: 	    ECG:  	      DIAGNOSTIC TESTING:  [ ] Echocardiogram:  [ ]  Catheterization:  [ ] Stress Test:    OTHER: 	      CURRENT MEDICATIONS:  carvedilol 6.25 milliGRAM(s) Oral every 12 hours  hydrALAZINE 100 milliGRAM(s) Oral every 8 hours      guaiFENesin   Syrup  (Sugar-Free) 100 milliGRAM(s) Oral every 6 hours PRN    acetaminophen   Tablet .. 650 milliGRAM(s) Oral every 6 hours PRN      dextrose 40% Gel 15 Gram(s) Oral once PRN  dextrose 50% Injectable 12.5 Gram(s) IV Push once  dextrose 50% Injectable 25 Gram(s) IV Push once  dextrose 50% Injectable 25 Gram(s) IV Push once  glucagon  Injectable 1 milliGRAM(s) IntraMuscular once PRN  insulin glargine Injectable (LANTUS) 20 Unit(s) SubCutaneous at bedtime  insulin lispro (HumaLOG) corrective regimen sliding scale   SubCutaneous three times a day before meals  insulin lispro Injectable (HumaLOG) 10 Unit(s) SubCutaneous three times a day before meals  simvastatin 40 milliGRAM(s) Oral at bedtime    dextrose 5%. 1000 milliLiter(s) IV Continuous <Continuous>  heparin  Injectable 5000 Unit(s) SubCutaneous every 8 hours  iron sucrose IVPB 100 milliGRAM(s) IV Intermittent every 24 hours      LABS:	 	    CARDIAC MARKERS:  Troponin I:   CPK total =  CK MB=   CKMB index=                           9.8    7.93  )-----------( 264      ( 19 Feb 2020 07:00 )             32.0     02-19    141  |  103  |  64.0<H>  ----------------------------<  129<H>  4.4   |  22.0  |  1.98<H>    Ca    9.9      19 Feb 2020 07:00  Phos  5.7     02-19  Mg     2.2     02-19    TPro  6.6  /  Alb  3.8  /  TBili  0.4  /  DBili  0.1  /  AST  26  /  ALT  33<H>  /  AlkPhos  207<H>  02-18    proBNP: Serum Pro-Brain Natriuretic Peptide: 704 pg/mL (02-16 @ 15:01)    Lipid Profile:   HgA1c:   TSH: CARDIOLOGY PROGRESS NOTE   ([facility])                                                                                                        Subjective: breathing improved  Vitals:  T(C): 36.7 (02-19-20 @ 15:28), Max: 36.7 (02-18-20 @ 21:45)  HR: 70 (02-19-20 @ 17:47) (70 - 81)  BP: 160/58 (02-19-20 @ 17:47) (143/65 - 183/85)  RR: 18 (02-19-20 @ 15:28) (17 - 18)  SpO2: 96% (02-19-20 @ 15:28) (94% - 96%)  Wt(kg): --  I&O's Summary    18 Feb 2020 07:01  -  19 Feb 2020 07:00  --------------------------------------------------------  IN: 240 mL / OUT: 200 mL / NET: 40 mL          PHYSICAL EXAM:  Appearance: Normal	  HEENT:   Atraumatic  Cardiovascular: Normal S1 S2, No JVD,   Respiratory: Lungs clear to auscultation	  Gastrointestinal:  Soft, Non-tender, + BS	  Skin: No rashes, No ecchymoses, No cyanosis  Neurologic: Alert and awake, able to move extremities  Extremities: + bl ankle  edema          CURRENT MEDICATIONS:  carvedilol 6.25 milliGRAM(s) Oral every 12 hours  hydrALAZINE 100 milliGRAM(s) Oral every 8 hours      guaiFENesin   Syrup  (Sugar-Free) 100 milliGRAM(s) Oral every 6 hours PRN    acetaminophen   Tablet .. 650 milliGRAM(s) Oral every 6 hours PRN      dextrose 40% Gel 15 Gram(s) Oral once PRN  dextrose 50% Injectable 12.5 Gram(s) IV Push once  dextrose 50% Injectable 25 Gram(s) IV Push once  dextrose 50% Injectable 25 Gram(s) IV Push once  glucagon  Injectable 1 milliGRAM(s) IntraMuscular once PRN  insulin glargine Injectable (LANTUS) 20 Unit(s) SubCutaneous at bedtime  insulin lispro (HumaLOG) corrective regimen sliding scale   SubCutaneous three times a day before meals  insulin lispro Injectable (HumaLOG) 10 Unit(s) SubCutaneous three times a day before meals  simvastatin 40 milliGRAM(s) Oral at bedtime    dextrose 5%. 1000 milliLiter(s) IV Continuous <Continuous>  heparin  Injectable 5000 Unit(s) SubCutaneous every 8 hours  iron sucrose IVPB 100 milliGRAM(s) IV Intermittent every 24 hours      LABS:	 	                              9.8    7.93  )-----------( 264      ( 19 Feb 2020 07:00 )             32.0     02-19    141  |  103  |  64.0<H>  ----------------------------<  129<H>  4.4   |  22.0  |  1.98<H>    Ca    9.9      19 Feb 2020 07:00  Phos  5.7     02-19  Mg     2.2     02-19    TPro  6.6  /  Alb  3.8  /  TBili  0.4  /  DBili  0.1  /  AST  26  /  ALT  33<H>  /  AlkPhos  207<H>  02-18    proBNP: Serum Pro-Brain Natriuretic Peptide: 704 pg/mL (02-16 @ 15:01)      NST: Medium to Large fixed defect involving entire anterior and  anterolateral segments with normal wall motion on gated  study. Probably represents breast soft tissue attenuation  artifact, needs to be clinically correlated.  No definite  evidence of reversible ischemia. No prone imaging.

## 2020-02-19 NOTE — PROGRESS NOTE ADULT - ASSESSMENT
64 year old female with hx of DM , CKD morbid obesity is coming in with c/o SOB for 5 days ,     #. Acute HFpEF exacerbation. newly diagnosed CHF  - echo: EF 60-65%. moderate pulm HTN. Flattening of the interventricular septum suggestive of volume overload  - spoke to cardiology, nuclear stress test tomorrow 2/19  - HOLD lasix due to SETH  - strict Is and Os and daily weight  - low salt diet     # CKD 4 possibly contrast induced SETH    KDIGO Care Bundle:    Avoidance of nephrotoxins/nephrotoxin medication adjustment  Medication dosage adjustment for kidney function  Continuous IVF administration (guided by CVP and testing of fluid responsiveness for 6 hours in combination with nephorology consultation)  Discontinuation of ACE/ARBs for first 48 postoperative hours  Close monitoring of serum Creatinine and UO  Acid-base, electrolyte and albumin status management  Avoidance of hyperglycemia for first 72 postoperative hours  Consider alternatives to radiocontrast administration  Optimizing hemodynamics    - PRN lasix  - hold Losartan and nephrotoxic meds  - monitor & Trend Scr., UO,     # Hypertensive crises/ hypertensive emergency on admission.  BP persistently elevated (168/74- 171/72)  Coreg 3.125mg q12  hold losartan  On  hydralazine 100mg q8 Hrs.,     # DM2  - On Lantus 20u in morning, 22u at bedtime and lispro 10u pre-meal and ISS  Hba1c 7.4 %,     OP F/U When discharged,     TY,

## 2020-02-19 NOTE — PROGRESS NOTE ADULT - SUBJECTIVE AND OBJECTIVE BOX
Vital Signs Last 24 Hrs,    T(C): 36.6 (2020 05:27), Max: 37.1 (2020 15:53)  T(F): 97.9 (2020 05:27), Max: 98.8 (2020 15:53)  HR: 75 (2020 05:27) (74 - 86)  BP: 156/58 (2020 08:34) (146/79 - 183/85)  RR: 17 (2020 05:27) (17 - 18)  SpO2: 95% (2020 05:27) (93% - 95%)    141    |  103    |  64.0<H>  ----------------------------<  129<H>  Ca:9.9   (2020 07:00)  4.4     |  22.0   |  1.98<H>    eGFR if Non : 26 <L>  eGFR if : 30 <L>    TPro  6.6    /  Alb  3.8    /  TBili  0.4    /  DBili  0.1    /  AST  26     /  ALT  33<H>  /  AlkPhos  207<H>  2020 05:46                        9.8<L>  7.93  )-----------( 264      ( 2020 07:00 )             32.0<L>    Phos:5.7 mg/dL<H> M.2 mg/dL PTH:-- Uric acid:-- Serum Osm:--  Ferritin:60 ng/mL Iron:60 ug/dL TIBC:388 ug/dL Tsat:15 %  B12:-- TSH:-- ( @ 07:00)    Urinalysis Basic - ( 2020 22:58 )  Color: Yellow / Appearance: Clear / S.020 / pH: x  Gluc: x / Ketone: Negative  / Bili: Negative / Urobili: Negative mg/dL   Blood: x / Protein: 100 mg/dL<!> / Nitrite: Negative   Leuk Esterase: Trace<!> / RBC: Negative /HPF / WBC 0-2   Sq Epi: x / Non Sq Epi: Occasional / Bacteria: x    HPI: This is 65 y/o female with hx of IDDM2, HTN, HLD, CKD who presents with SOB and b/l LE edema x5 days. Pt was on a cruise when she developed SÁNCHEZ, she was diagnosed with PNA by a cruise doctor and started on antibiotics. Her symptoms have not improved so she decided to come to the ER. Troponin negative x1, proBNP 704. LE dopplers negative for DVT. CTA negative for PE but showing pulmonary edema and small b/l pleural effusions.  EKG reveals ST with nonspecific T-wave abnormality. Pt states she had a cardiac cath at [facility] 6 years ago and was told everything was normal. She does not have a cardiologist. LENY completed.      PAST HISTORY  --------------------------------------------------------------------------------  PAST MEDICAL & SURGICAL HISTORY:  Dyslipidemia  CKD (chronic kidney disease)  Diabetes mellitus  Other and unspecified diseases of appendix    FAMILY HISTORY:  No pertinent family history in first degree relatives    PAST SOCIAL HISTORY:    ALLERGIES & MEDICATIONS  --------------------------------------------------------------------------------  Allergies  Levaquin (Unknown)    Standing Inpatient Medications  carvedilol 3.125 milliGRAM(s) Oral every 12 hours  dextrose 5%. 1000 milliLiter(s) IV Continuous <Continuous>  dextrose 50% Injectable 12.5 Gram(s) IV Push once  dextrose 50% Injectable 25 Gram(s) IV Push once  dextrose 50% Injectable 25 Gram(s) IV Push once  heparin  Injectable 5000 Unit(s) SubCutaneous every 8 hours  insulin glargine Injectable (LANTUS) 22 Unit(s) SubCutaneous at bedtime  insulin glargine Injectable (LANTUS) 20 Unit(s) SubCutaneous once  insulin lispro (HumaLOG) corrective regimen sliding scale   SubCutaneous three times a day before meals  insulin lispro Injectable (HumaLOG) 10 Unit(s) SubCutaneous three times a day before meals  losartan 50 milliGRAM(s) Oral daily  simvastatin 40 milliGRAM(s) Oral at bedtime    PRN Inpatient Medications  dextrose 40% Gel 15 Gram(s) Oral once PRN  glucagon  Injectable 1 milliGRAM(s) IntraMuscular once PRN    REVIEW OF SYSTEMS  --------------------------------------------------------------------------------  Gen: No weight changes, fatigue, fevers/chills, weakness  Skin: No rashes  Head/Eyes/Ears/Mouth: No headache; Normal hearing; Normal vision w/o blurriness  Respiratory: + Dyspnea on exertion. No cough, wheezing, hemoptysis  CV: No chest pain. + LE edema  GI: No abdominal pain, diarrhea, constipation, nausea, vomiting, melena, hematochezia  : No increased frequency, dysuria, hematuria, nocturia  MSK: +back pain.  Neuro: No dizziness/lightheadedness, weakness, seizures, numbness, tingling  Heme: No easy bruising or bleeding  Endo: No heat/cold intolerance  Psych: No significant nervousness, anxiety, stress, depression    All other systems were reviewed and are negative, except as noted.    VITALS/PHYSICAL EXAM  --------------------------------------------------------------------------------  T(C): 36.6 (20 @ 06:36), Max: 36.7 (20 @ 15:35)  HR: 73 (20 @ 06:36) (73 - 84)  BP: 171/72 (20 @ 06:36) (168/74 - 171/72)  RR: 18 (20 @ 06:36) (18 - 18)  SpO2: 95% (20 @ 06:36) (95% - 98%)    Height (cm): 152.4 (20 @ 22:30)  Weight (kg): 90.7 (20 @ 12:43)  BMI (kg/m2): 39.1 (20 @ 22:30)  BSA (m2): 1.87 (20 @ 22:30)    20 @ 07:01  -  20 @ 07:00  --------------------------------------------------------  IN: 480 mL / OUT: 900 mL / NET: -420 mL    Physical Exam:  	Gen: NAD, sitting up in chair  	HEENT: supple neck  	Pulm: fine crackles at lung bases B/L  	CV: RRR, S1S2; no rub  	Back: No spinal or CVA tenderness; no sacral edema  	Abd: +BS, soft, nontender/nondistended  	: No suprapubic tenderness  	UE: Warm, no edema; no asterixis  	LE: Warm, + edema bilaterally  	Neuro: No focal deficits  	Psych: Normal affect and mood  	Skin: Warm, without rashes      LABS/STUDIES  --------------------------------------------------------------------------------              9.8    9.17  >-----------<  249      [20 @ 05:46]              31.5     139  |  102  |  56.0  ----------------------------<  340      [20 @ 05:46]  4.7   |  21.0  |  1.89        Ca     9.8     [20 @ 05:46]      Mg     2.1     [02-18-20 @ 05:46]    TPro  6.6  /  Alb  3.8  /  TBili  0.4  /  DBili  0.1  /  AST  26  /  ALT  33  /  AlkPhos  207  [20 @ 05:46]    PT/INR: PT 10.5 , INR 0.93       [20 @ 14:23]  PTT: 33.0       [20 @ 14:23]    Troponin <0.01      [20 @ 14:23]    Creatinine Trend:  SCr 1.89 [:46]  SCr 1.24 [:59]  SCr 1.07 [:23]    HbA1c 7.5      [20 05:59]    HCV 0.15, Nonreact      [20 14:52]    EXAM:  ECHO TTE W CON COMPLETE W DOPP      PROCEDURE DATE:  2020     INTERPRETATION:  REPORT:    TRANSTHORACIC ECHOCARDIOGRAM REPORT    Patient Name:   CHARIS ROBERT Patient Location: Inpatient  Medical Rec #:  [id number]            Accession #:      [id number]  Account #:                          Height:           60.0 in 152.4 cm  YOB: 1955           Weight:           215.0 lb 97.52 kg  Patient Age:    64 years            BSA:              1.92 m²  Patient Gender: F                   BP:               160/70 mmHg    Date of Exam:        2020 2:45:40 PM  Sonographer:         Deysi Leach  Referring Physician: Selena Fnamaya    Procedure:     2D Echo/Doppler/Color Doppler Complete and Echocardiogram with                 Definity Contrast.  Indications:   Dyspnea, unspecified - R06.00  Diagnosis:     Pulmonary hypertension  Study Details: Technically difficult study. Study quality was adversely affected                 due to patient obesity, body habitus and lung interference.      2D AND M-MODE MEASUREMENTS (normal ranges within parentheses):  Left Ventricle:                  Normal   Aorta/Left Atrium:            Normal  IVSd (2D):              1.23 cm (0.7-1.1) LA Volume Index    27.7 ml/m²  LVPWd (2D):             1.18 cm (0.7-1.1) Right Ventricle:  LVIDd (2D):             4.33 cm (3.4-5.7) RVd (2D):        3.03 cm  LVIDs (2D):             2.51 cm           TAPSE:           1.97 cm  LV FS (2D):             42.0 %   (>25%)  Relative Wall Thickness  0.55    (<0.42)    LV SYSTOLIC FUNCTION BY 2D PLANIMETRY (MOD):  EF-A4C View: 76.3 % EF-A2C View: 57.3 % EF-Biplane: 68 %    LV DIASTOLIC FUNCTION:  MV Peak E: 1.23 m/s E/e' Ratio: 22.40  MV Peak A: 1.38 m/s Decel Time: 162 msec  E/A Ratio: 0.89    SPECTRAL DOPPLER ANALYSIS (where applicable):  Mitral Valve:  MV P1/2 Time: 46.98 msec  MV Area, PHT: 4.68 cm²    Aortic Valve: AoV Max Tj: 1.74 m/s AoV Peak P.1 mmHg AoV Mean P.0 mmHg    LVOT Vmax: 0.92 m/s LVOT VTI: 0.235 m LVOT Diameter: 1.79 cm    AoV Area, Vmax: 1.33 cm² AoV Area, VTI: 1.43 cm² AoV Area, Vmn: 1.32 cm²  Ao VTI: 0.413  Tricuspid Valve and PA/RV Systolic Pressure: TR Max Velocity: 3.46 m/s RA Pressure: 3 mmHg RVSP/PASP: 50.9 mmHg       PHYSICIAN INTERPRETATION:  Left Ventricle: Endocardial visualization was enhanced with intravenous echo contrast. The left ventricular internal cavity size is normal. Left ventricular wall thickness is mildly increased.  Global LV systolic function was normal. Left ventricular ejection fraction, by visual estimation, is 60 to 65%. Left ventricle chordal calcification. Flattening of the interventricular septum suggestive of right ventricle pressure and/or volume overload.  Right Ventricle: The right ventricular size is mildly enlarged. RV systolic function is normal.  Left Atrium: Normal left atrial size.  Right Atrium: Normal right atrial size.  Pericardium: Trivial pericardial effusion is present.  Mitral Valve: Mild thickening and calcification of the anterior and posterior mitral valve leaflets. Mild mitral valve regurgitation is seen.  Tricuspid Valve: The tricuspid valve is not well seen. Mild tricuspid regurgitation is visualized. Estimated pulmonary artery systolic pressure is 50.9 mmHg assuming a right atrial pressure of 3 mmHg, which is consistent with moderate pulmonary hypertension.  Aortic Valve: The aortic valve was not well visualized. Mild aortic stenosis (peak velocity 1.7 m/s, mean gradient 7 mmHg, calculated CAMMY by continuity equation 1.5 cm^2).  Pulmonic Valve: The pulmonic valve was not well visualized.  Aorta: The aortic root is normal in size and structure.  Venous: The inferior vena cava was normal sized, with respiratory size variation greater than 50%.     Summary:   1. Technically difficult study with poor endocardial visualization.   2. Endocardial visualization was enhanced with intravenous echo contrast.   3. Left ventricular ejection fraction, by visual estimation, is 60 to 65%.   4. Normal global left ventricular systolic function.   5. Mildly increased LV wall thickness.   6. Normal left ventricular internal cavity size.   7. Left ventricle chordal calcification. Flattening of the interventricular septum suggestive of right ventricle pressure and/or volume overload.   8. The right ventricle is mildly enlarged with normal systolic function.   9. Normal right atrial size.  10. Normal left atrial size.  11. Mild thickening and calcification of the anterior and posterior mitral valve leaflets.  12. Mild aortic stenosis (peak velocity 1.7 m/s, mean gradient 7 mmHg, calculated CAMMY by continuity equation 1.5 cm^2).  13. Estimated pulmonary artery systolic pressure is 50.9 mmHg assuming a right atrial pressure of 3 mmHg, which is consistent with moderate pulmonary hypertension.  14. Trivial pericardial effusion.      1. Acute Kidney Injury on CKD (sees Nephrologist Genaro in [place] )    Creatinine Trend:  SCr 1.89 [ @ 05:46]  SCr 1.24 [ @ 05:59]  SCr 1.07 [16 @ 14:23]    -Renal ultrasound - Reviewed,     - CXR Now neg., Hold lasix for now    CT angio of chest with IV contrast on 20    2. Anemia  -Anemia work-up    3. Acute congestive heart failure, unspecified heart failure type  -TTE in am  -daily weights, low salt diet  -nuclear stress testing in am    4. Hypertension, unspecified type  Recommendation: -pt states she's on Losartan and Lisinopril at home- d/c Lisinopril and keep on Losartan 50 mg.   continue Coreg 3.125 mg bid    4. Hyperglycemia - glucose in 300s from BMP                          Contrast-Induced SETH    Contrast-induced SETH (CI-SETH, also referred to as contrast-associated SETH) is a specific form of SETH that usually manifests as a transient small increase in Scr concentration within a few days of exposure to intravascular iodinated contrast. Despite its usually self-limited course, CI-SETH is associated with increased short- and long-term mortality, as well as progressive CKD. Recently, the degree to which radiocontrast affects the kidney has been debated because several studies (both meta-analyses and cohort studies) have suggested that in the aggregate population, the risk for SETH after contrast administration is perhaps overemphasized.    Nonetheless, in clinical practice, for any given study requiring iodinated contrast, the potential risks and benefits should be weighed closely. Along the same lines, patient- and procedure-level factors contribute to the risk for CI-SETH and should be assessed. The primary risk factor for CI-SETH is CKD, and the incidence of CI-SETH increases incrementally as GFR decreases or proteinuria/albuminuria increases. Diabetes further increases the risk in those with CKD. Additional patient-specific risk factors include low effective circulating blood volume and nonsteroidal anti-inflammatory drug use. Procedure-related risk factors include higher contrast volume, intra-arterial procedures, multiple contrast exposures in a short interval, and hyperosmolar contrast agents.    Management of CI-SETH aims primarily at prevention. Consideration should be given to alternative noncontrast studies if possible. Those who undergo iodinated contrast studies should have treatment with nonsteroidal anti-inflammatory drugs and other nephrotoxins discontinued, ideally at least 24 hours before the procedure. Low- or iso-osmolar radiocontrast should be used, at the lowest possible volume required. Isotonic intravenous fluid administration reduces the risk for CI-SETH and should be used in those at elevated risk. Typical regimens consist of a 1-mL/kg/h infusion 12 hours before and 12 hours after contrast exposure, or 3 mL/kg/h 1 hour before and 1.5 mL/kg/h for 4 to 6 hours postprocedure. With regard to fluid selection, although small studies suggested a benefit to the use of isotonic sodium bicarbonate solution, a large randomized clinical trial of isotonic bicarbonate versus normal saline solution (factorialized with N-acetylcysteine vs placebo) in high-risk patients undergoing angiography showed no benefit with bicarbonate or N-acetylcysteine with regard to a composite end point of death, RRT, and 50% reduction in GFR at 90 days. There have been a variety of other pharmacotherapies evaluated for CI-SETH prevention, none of which is clearly beneficial. Hemodialysis after administration of contrast is ineffective for preventing CI-SETH and may cause harm.:

## 2020-02-19 NOTE — PROGRESS NOTE ADULT - ASSESSMENT
This is 65 y/o female with hx of IDDM2, HTN, HLD, CKD who presents with SOB and b/l LE edema x5 days. Pt was on a cruise when she developed SÁNCHEZ,  Troponin negative x1, proBNP 704. LE dopplers negative for DVT. CTA negative for PE but showing pulmonary edema and small b/l pleural effusions. EKG reveals ST with nonspecific T-wave abnormality. Pt states she had a cardiac cath at [place] 6 years ago and was told everything was normal.       Acute on Chronic Diastolic Heart Failure  Lasix on hold due to worsening renal function  Echo: EF 60-65%. Moderate pHTN.   NST: fixed defect. Attenuation artifact.     SETH on suspected CKD 3 possibly contrast induced SETH  hold lasix, losartan    HTN  Monitor B  Coreg, hydralazine  Advance meds as needed

## 2020-02-20 LAB
ANION GAP SERPL CALC-SCNC: 16 MMOL/L — SIGNIFICANT CHANGE UP (ref 5–17)
BUN SERPL-MCNC: 63 MG/DL — HIGH (ref 8–20)
CALCIUM SERPL-MCNC: 10.1 MG/DL — SIGNIFICANT CHANGE UP (ref 8.6–10.2)
CHLORIDE SERPL-SCNC: 106 MMOL/L — SIGNIFICANT CHANGE UP (ref 98–107)
CO2 SERPL-SCNC: 21 MMOL/L — LOW (ref 22–29)
CREAT SERPL-MCNC: 1.81 MG/DL — HIGH (ref 0.5–1.3)
GLUCOSE BLDC GLUCOMTR-MCNC: 117 MG/DL — HIGH (ref 70–99)
GLUCOSE BLDC GLUCOMTR-MCNC: 126 MG/DL — HIGH (ref 70–99)
GLUCOSE BLDC GLUCOMTR-MCNC: 173 MG/DL — HIGH (ref 70–99)
GLUCOSE BLDC GLUCOMTR-MCNC: 205 MG/DL — HIGH (ref 70–99)
GLUCOSE SERPL-MCNC: 92 MG/DL — SIGNIFICANT CHANGE UP (ref 70–99)
HCT VFR BLD CALC: 33.7 % — LOW (ref 34.5–45)
HGB BLD-MCNC: 10.4 G/DL — LOW (ref 11.5–15.5)
MCHC RBC-ENTMCNC: 28.8 PG — SIGNIFICANT CHANGE UP (ref 27–34)
MCHC RBC-ENTMCNC: 30.9 GM/DL — LOW (ref 32–36)
MCV RBC AUTO: 93.4 FL — SIGNIFICANT CHANGE UP (ref 80–100)
PLATELET # BLD AUTO: 276 K/UL — SIGNIFICANT CHANGE UP (ref 150–400)
POTASSIUM SERPL-MCNC: 4.1 MMOL/L — SIGNIFICANT CHANGE UP (ref 3.5–5.3)
POTASSIUM SERPL-SCNC: 4.1 MMOL/L — SIGNIFICANT CHANGE UP (ref 3.5–5.3)
RBC # BLD: 3.61 M/UL — LOW (ref 3.8–5.2)
RBC # FLD: 13.3 % — SIGNIFICANT CHANGE UP (ref 10.3–14.5)
SODIUM SERPL-SCNC: 143 MMOL/L — SIGNIFICANT CHANGE UP (ref 135–145)
WBC # BLD: 7.07 K/UL — SIGNIFICANT CHANGE UP (ref 3.8–10.5)
WBC # FLD AUTO: 7.07 K/UL — SIGNIFICANT CHANGE UP (ref 3.8–10.5)

## 2020-02-20 PROCEDURE — 99232 SBSQ HOSP IP/OBS MODERATE 35: CPT

## 2020-02-20 RX ORDER — FUROSEMIDE 40 MG
40 TABLET ORAL ONCE
Refills: 0 | Status: COMPLETED | OUTPATIENT
Start: 2020-02-20 | End: 2020-02-20

## 2020-02-20 RX ADMIN — IRON SUCROSE 210 MILLIGRAM(S): 20 INJECTION, SOLUTION INTRAVENOUS at 14:24

## 2020-02-20 RX ADMIN — Medication 100 MILLIGRAM(S): at 06:03

## 2020-02-20 RX ADMIN — Medication 10 UNIT(S): at 12:56

## 2020-02-20 RX ADMIN — Medication 650 MILLIGRAM(S): at 15:25

## 2020-02-20 RX ADMIN — Medication 40 MILLIGRAM(S): at 11:07

## 2020-02-20 RX ADMIN — HEPARIN SODIUM 5000 UNIT(S): 5000 INJECTION INTRAVENOUS; SUBCUTANEOUS at 06:03

## 2020-02-20 RX ADMIN — INSULIN GLARGINE 20 UNIT(S): 100 INJECTION, SOLUTION SUBCUTANEOUS at 21:11

## 2020-02-20 RX ADMIN — Medication 650 MILLIGRAM(S): at 14:25

## 2020-02-20 RX ADMIN — Medication 10 UNIT(S): at 08:59

## 2020-02-20 RX ADMIN — SIMVASTATIN 40 MILLIGRAM(S): 20 TABLET, FILM COATED ORAL at 21:11

## 2020-02-20 RX ADMIN — HEPARIN SODIUM 5000 UNIT(S): 5000 INJECTION INTRAVENOUS; SUBCUTANEOUS at 21:11

## 2020-02-20 RX ADMIN — Medication 100 MILLIGRAM(S): at 14:24

## 2020-02-20 RX ADMIN — Medication 100 MILLIGRAM(S): at 21:11

## 2020-02-20 RX ADMIN — CARVEDILOL PHOSPHATE 6.25 MILLIGRAM(S): 80 CAPSULE, EXTENDED RELEASE ORAL at 17:20

## 2020-02-20 RX ADMIN — Medication 1: at 12:57

## 2020-02-20 RX ADMIN — Medication 10 UNIT(S): at 17:19

## 2020-02-20 RX ADMIN — CARVEDILOL PHOSPHATE 6.25 MILLIGRAM(S): 80 CAPSULE, EXTENDED RELEASE ORAL at 06:03

## 2020-02-20 NOTE — PROGRESS NOTE ADULT - ASSESSMENT
This is 63 y/o female with hx of IDDM2, HTN, HLD, CKD who presents with SOB and b/l LE edema x5 days. Pt was on a cruise when she developed SÁNCHEZ,  Troponin negative x1, proBNP 704. LE dopplers negative for DVT. CTA negative for PE but showing pulmonary edema and small b/l pleural effusions. EKG reveals ST with nonspecific T-wave abnormality. Pt states she had a cardiac cath at [facility] 6 years ago and was told everything was normal.       Acute on Chronic Diastolic Heart Failure  Echo: EF 60-65%. Moderate pHTN.   NST: fixed defect. Attenuation artifact.     SETH on suspected CKD 3 possibly contrast induced SETH  Monitor SCr    Event over night noted:  2.89sec pause during monitor review. Patient asymptomatic   Cont telemetry.  May need Sleep Apnea DOTY as outpt.  If prolonged pauses recur may need reduce BB.

## 2020-02-20 NOTE — PROGRESS NOTE ADULT - ASSESSMENT
64 year old female with hx of DM2 , CKD3 morbid obesity presents for shortness of breath    acute diastolic HF:     - echo: EF 60-65%. moderate pulm HTN. Flattening of the interventricular septum suggestive of volume overload  - spoke to cardiology, nuclear stress test 2/19  - LASIX 40mg IVP x 1 today    # SETH on suspected CKD 3 possibly contrast induced SETH--seems plateaued   - nephro following  - hold Losartan and nephrotoxic meds  - monitor BMP    # Hypertensive crises/ hypertensive emergency on admission.  BP persistently elevated (168/74- 171/72)  increase coreg to 6.25BID  hold losartan  add hydralazine 100mg q8      # DM2--stable fingersticks  - Per pt, she takes Basaglar 10u q12 and then Lantus 22 u at bedtime. Humalog 10u pre-meal.  - c/w current regimen  Hba1c 7.4    #DLP  cont lipitor, patient does not recall her home dose.     #.Morbid obesity   lifestyle modification     #. DVT prophylaxis  heparin     dc in 24 hrs if renal function continues to improve

## 2020-02-20 NOTE — PROGRESS NOTE ADULT - SUBJECTIVE AND OBJECTIVE BOX
CARDIOLOGY PROGRESS NOTE   ([facility])                                                                                                        Subjective:   pt is seen in am.  denied CP, nad, alert, awake      Vitals:  T(C): 36.7 (02-20-20 @ 15:49), Max: 36.7 (02-20-20 @ 15:49)  HR: 78 (02-20-20 @ 17:18) (69 - 80)  BP: 118/62 (02-20-20 @ 17:18) (116/47 - 157/68)  RR: 18 (02-20-20 @ 17:18) (18 - 19)  SpO2: 95% (02-20-20 @ 17:18) (94% - 95%)  Wt(kg): --  I&O's Summary    19 Feb 2020 07:01  -  20 Feb 2020 07:00  --------------------------------------------------------  IN: 240 mL / OUT: 300 mL / NET: -60 mL    20 Feb 2020 07:01  -  20 Feb 2020 20:00  --------------------------------------------------------  IN: 720 mL / OUT: 550 mL / NET: 170 mL          PHYSICAL EXAM:  Appearance: Normal	  HEENT:   Atraumatic  Cardiovascular: Normal S1 S2, No JVD,   Respiratory: Lungs clear to auscultation	  Gastrointestinal:  Soft, Non-tender, + BS	  Skin: No rashes, No ecchymoses, No cyanosis  Neurologic: Alert and awake, able to move extremities  Extremities: + edema        CURRENT MEDICATIONS:  carvedilol 6.25 milliGRAM(s) Oral every 12 hours  hydrALAZINE 100 milliGRAM(s) Oral every 8 hours      guaiFENesin   Syrup  (Sugar-Free) 100 milliGRAM(s) Oral every 6 hours PRN    acetaminophen   Tablet .. 650 milliGRAM(s) Oral every 6 hours PRN      dextrose 40% Gel 15 Gram(s) Oral once PRN  dextrose 50% Injectable 12.5 Gram(s) IV Push once  dextrose 50% Injectable 25 Gram(s) IV Push once  dextrose 50% Injectable 25 Gram(s) IV Push once  glucagon  Injectable 1 milliGRAM(s) IntraMuscular once PRN  insulin glargine Injectable (LANTUS) 20 Unit(s) SubCutaneous at bedtime  insulin lispro (HumaLOG) corrective regimen sliding scale   SubCutaneous three times a day before meals  insulin lispro Injectable (HumaLOG) 10 Unit(s) SubCutaneous three times a day before meals  simvastatin 40 milliGRAM(s) Oral at bedtime    dextrose 5%. 1000 milliLiter(s) IV Continuous <Continuous>  heparin  Injectable 5000 Unit(s) SubCutaneous every 8 hours  iron sucrose IVPB 100 milliGRAM(s) IV Intermittent every 24 hours      LABS:	 	                              10.4   7.07  )-----------( 276      ( 20 Feb 2020 07:07 )             33.7     02-20    143  |  106  |  63.0<H>  ----------------------------<  92  4.1   |  21.0<L>  |  1.81<H>    Ca    10.1      20 Feb 2020 07:07  Phos  5.7     02-19  Mg     2.2     02-19      proBNP: Serum Pro-Brain Natriuretic Peptide: 704 pg/mL (02-16 @ 15:01)

## 2020-02-20 NOTE — PROGRESS NOTE ADULT - SUBJECTIVE AND OBJECTIVE BOX
seen for HF, EVELYN    still edematous, minimal cough, no sob  ros negative     MEDICATIONS  (STANDING):  carvedilol 6.25 milliGRAM(s) Oral every 12 hours  dextrose 5%. 1000 milliLiter(s) (50 mL/Hr) IV Continuous <Continuous>  dextrose 50% Injectable 12.5 Gram(s) IV Push once  dextrose 50% Injectable 25 Gram(s) IV Push once  dextrose 50% Injectable 25 Gram(s) IV Push once  heparin  Injectable 5000 Unit(s) SubCutaneous every 8 hours  hydrALAZINE 100 milliGRAM(s) Oral every 8 hours  insulin glargine Injectable (LANTUS) 20 Unit(s) SubCutaneous at bedtime  insulin lispro (HumaLOG) corrective regimen sliding scale   SubCutaneous three times a day before meals  insulin lispro Injectable (HumaLOG) 10 Unit(s) SubCutaneous three times a day before meals  iron sucrose IVPB 100 milliGRAM(s) IV Intermittent every 24 hours  simvastatin 40 milliGRAM(s) Oral at bedtime    MEDICATIONS  (PRN):  acetaminophen   Tablet .. 650 milliGRAM(s) Oral every 6 hours PRN Temp greater or equal to 38C (100.4F), Mild Pain (1 - 3)  dextrose 40% Gel 15 Gram(s) Oral once PRN Blood Glucose LESS THAN 70 milliGRAM(s)/deciliter  glucagon  Injectable 1 milliGRAM(s) IntraMuscular once PRN Glucose LESS THAN 70 milligrams/deciliter  guaiFENesin   Syrup  (Sugar-Free) 100 milliGRAM(s) Oral every 6 hours PRN Cough      Allergies    Levaquin (Unknown)    Vital Signs Last 24 Hrs  T(C): 36.4 (2020 10:45), Max: 36.7 (2020 15:28)  T(F): 97.5 (2020 10:45), Max: 98 (2020 15:28)  HR: 80 (2020 10:45) (69 - 81)  BP: 146/67 (2020 10:45) (134/57 - 173/82)  BP(mean): --  RR: 18 (2020 10:45) (18 - 19)  SpO2: 95% (2020 10:45) (94% - 96%)    PHYSICAL EXAM:    GENERAL: NAD  CHEST/LUNG: bibasilar crackles  HEART: Regular rate and rhythm; S1 S2  ABDOMEN: Soft,  Bowel sounds present  EXTREMITIES:  2+ edema   NERVOUS SYSTEM:  Alert & Oriented X3, Motor Strength 5/5 B/L upper and lower extremities      LABS:                        10.4   7.07  )-----------( 276      ( 2020 07:07 )             33.7     02-20    143  |  106  |  63.0<H>  ----------------------------<  92  4.1   |  21.0<L>  |  1.81<H>    Ca    10.1      2020 07:07  Phos  5.7     02-19  Mg     2.2     02-19        Urinalysis Basic - ( 2020 22:58 )    Color: Yellow / Appearance: Clear / S.020 / pH: x  Gluc: x / Ketone: Negative  / Bili: Negative / Urobili: Negative mg/dL   Blood: x / Protein: 100 mg/dL / Nitrite: Negative   Leuk Esterase: Trace / RBC: Negative /HPF / WBC 0-2   Sq Epi: x / Non Sq Epi: Occasional / Bacteria: x        CAPILLARY BLOOD GLUCOSE      POCT Blood Glucose.: 173 mg/dL (2020 12:11)  POCT Blood Glucose.: 126 mg/dL (2020 08:21)  POCT Blood Glucose.: 151 mg/dL (2020 21:31)  POCT Blood Glucose.: 187 mg/dL (2020 16:49)  POCT Blood Glucose.: 205 mg/dL (2020 15:28)  POCT Blood Glucose.: 251 mg/dL (2020 14:38)        RADIOLOGY & ADDITIONAL TESTS:

## 2020-02-20 NOTE — PROGRESS NOTE ADULT - SUBJECTIVE AND OBJECTIVE BOX
Called by RN patient w/ 2.89sec pause during monitor review. Patient asymptomatic and no acute CP, palpitations or weakness noted.   VSS  PHYSICAL EXAM:  Appearance: Normal	  HEENT: EOMI, No JVD  Cardiovascular: Normal S1 S2, RRR   Respiratory: Lungs clear to auscultation	  Gastrointestinal:  Soft, Non-tender, + BS	  Skin: No rashes or ecchymoses  Neurologic: Alert and awake, able to move extremities  Extremities: + bl ankle  edema  65 yo F with SOB and b/l LE edema x 5 days treated for acute on chronic Diastolic Heart Failure, had one episode of sinus pause  Tele monitor, labs in AM, will hold AV nodals if continues to have pauses  Atropine at bedside,

## 2020-02-21 ENCOUNTER — TRANSCRIPTION ENCOUNTER (OUTPATIENT)
Age: 65
End: 2020-02-21

## 2020-02-21 VITALS
SYSTOLIC BLOOD PRESSURE: 128 MMHG | DIASTOLIC BLOOD PRESSURE: 70 MMHG | TEMPERATURE: 98 F | RESPIRATION RATE: 18 BRPM | OXYGEN SATURATION: 96 % | HEART RATE: 86 BPM

## 2020-02-21 PROBLEM — Z00.00 ENCOUNTER FOR PREVENTIVE HEALTH EXAMINATION: Status: ACTIVE | Noted: 2020-02-21

## 2020-02-21 LAB
ANION GAP SERPL CALC-SCNC: 13 MMOL/L — SIGNIFICANT CHANGE UP (ref 5–17)
BUN SERPL-MCNC: 64 MG/DL — HIGH (ref 8–20)
CALCIUM SERPL-MCNC: 9.4 MG/DL — SIGNIFICANT CHANGE UP (ref 8.6–10.2)
CHLORIDE SERPL-SCNC: 108 MMOL/L — HIGH (ref 98–107)
CO2 SERPL-SCNC: 22 MMOL/L — SIGNIFICANT CHANGE UP (ref 22–29)
CREAT SERPL-MCNC: 1.59 MG/DL — HIGH (ref 0.5–1.3)
GLUCOSE BLDC GLUCOMTR-MCNC: 163 MG/DL — HIGH (ref 70–99)
GLUCOSE BLDC GLUCOMTR-MCNC: 87 MG/DL — SIGNIFICANT CHANGE UP (ref 70–99)
GLUCOSE SERPL-MCNC: 163 MG/DL — HIGH (ref 70–99)
HCT VFR BLD CALC: 29.9 % — LOW (ref 34.5–45)
HGB BLD-MCNC: 9.2 G/DL — LOW (ref 11.5–15.5)
MAGNESIUM SERPL-MCNC: 2.4 MG/DL — SIGNIFICANT CHANGE UP (ref 1.6–2.6)
MCHC RBC-ENTMCNC: 29 PG — SIGNIFICANT CHANGE UP (ref 27–34)
MCHC RBC-ENTMCNC: 30.8 GM/DL — LOW (ref 32–36)
MCV RBC AUTO: 94.3 FL — SIGNIFICANT CHANGE UP (ref 80–100)
PHOSPHATE SERPL-MCNC: 4.7 MG/DL — SIGNIFICANT CHANGE UP (ref 2.4–4.7)
PLATELET # BLD AUTO: 249 K/UL — SIGNIFICANT CHANGE UP (ref 150–400)
POTASSIUM SERPL-MCNC: 4.2 MMOL/L — SIGNIFICANT CHANGE UP (ref 3.5–5.3)
POTASSIUM SERPL-SCNC: 4.2 MMOL/L — SIGNIFICANT CHANGE UP (ref 3.5–5.3)
RBC # BLD: 3.17 M/UL — LOW (ref 3.8–5.2)
RBC # FLD: 13.6 % — SIGNIFICANT CHANGE UP (ref 10.3–14.5)
SODIUM SERPL-SCNC: 143 MMOL/L — SIGNIFICANT CHANGE UP (ref 135–145)
WBC # BLD: 7.88 K/UL — SIGNIFICANT CHANGE UP (ref 3.8–10.5)
WBC # FLD AUTO: 7.88 K/UL — SIGNIFICANT CHANGE UP (ref 3.8–10.5)

## 2020-02-21 PROCEDURE — 87581 M.PNEUMON DNA AMP PROBE: CPT

## 2020-02-21 PROCEDURE — 85027 COMPLETE CBC AUTOMATED: CPT

## 2020-02-21 PROCEDURE — 84703 CHORIONIC GONADOTROPIN ASSAY: CPT

## 2020-02-21 PROCEDURE — 80053 COMPREHEN METABOLIC PANEL: CPT

## 2020-02-21 PROCEDURE — 71045 X-RAY EXAM CHEST 1 VIEW: CPT

## 2020-02-21 PROCEDURE — 93970 EXTREMITY STUDY: CPT

## 2020-02-21 PROCEDURE — 87798 DETECT AGENT NOS DNA AMP: CPT

## 2020-02-21 PROCEDURE — 36415 COLL VENOUS BLD VENIPUNCTURE: CPT

## 2020-02-21 PROCEDURE — 71275 CT ANGIOGRAPHY CHEST: CPT

## 2020-02-21 PROCEDURE — C8929: CPT

## 2020-02-21 PROCEDURE — 93005 ELECTROCARDIOGRAM TRACING: CPT

## 2020-02-21 PROCEDURE — A9500: CPT

## 2020-02-21 PROCEDURE — 80076 HEPATIC FUNCTION PANEL: CPT

## 2020-02-21 PROCEDURE — 82962 GLUCOSE BLOOD TEST: CPT

## 2020-02-21 PROCEDURE — 84484 ASSAY OF TROPONIN QUANT: CPT

## 2020-02-21 PROCEDURE — 93017 CV STRESS TEST TRACING ONLY: CPT

## 2020-02-21 PROCEDURE — 82728 ASSAY OF FERRITIN: CPT

## 2020-02-21 PROCEDURE — 83550 IRON BINDING TEST: CPT

## 2020-02-21 PROCEDURE — 99239 HOSP IP/OBS DSCHRG MGMT >30: CPT

## 2020-02-21 PROCEDURE — 96374 THER/PROPH/DIAG INJ IV PUSH: CPT

## 2020-02-21 PROCEDURE — 84100 ASSAY OF PHOSPHORUS: CPT

## 2020-02-21 PROCEDURE — 83540 ASSAY OF IRON: CPT

## 2020-02-21 PROCEDURE — 83970 ASSAY OF PARATHORMONE: CPT

## 2020-02-21 PROCEDURE — 83036 HEMOGLOBIN GLYCOSYLATED A1C: CPT

## 2020-02-21 PROCEDURE — 86803 HEPATITIS C AB TEST: CPT

## 2020-02-21 PROCEDURE — 83880 ASSAY OF NATRIURETIC PEPTIDE: CPT

## 2020-02-21 PROCEDURE — 87633 RESP VIRUS 12-25 TARGETS: CPT

## 2020-02-21 PROCEDURE — 85610 PROTHROMBIN TIME: CPT

## 2020-02-21 PROCEDURE — 84466 ASSAY OF TRANSFERRIN: CPT

## 2020-02-21 PROCEDURE — 78452 HT MUSCLE IMAGE SPECT MULT: CPT

## 2020-02-21 PROCEDURE — 85730 THROMBOPLASTIN TIME PARTIAL: CPT

## 2020-02-21 PROCEDURE — 76775 US EXAM ABDO BACK WALL LIM: CPT

## 2020-02-21 PROCEDURE — 81001 URINALYSIS AUTO W/SCOPE: CPT

## 2020-02-21 PROCEDURE — 82310 ASSAY OF CALCIUM: CPT

## 2020-02-21 PROCEDURE — 99285 EMERGENCY DEPT VISIT HI MDM: CPT | Mod: 25

## 2020-02-21 PROCEDURE — 87486 CHLMYD PNEUM DNA AMP PROBE: CPT

## 2020-02-21 PROCEDURE — 83735 ASSAY OF MAGNESIUM: CPT

## 2020-02-21 PROCEDURE — 80048 BASIC METABOLIC PNL TOTAL CA: CPT

## 2020-02-21 RX ORDER — FUROSEMIDE 40 MG
1 TABLET ORAL
Qty: 30 | Refills: 0
Start: 2020-02-21 | End: 2020-03-21

## 2020-02-21 RX ORDER — INSULIN GLARGINE 100 [IU]/ML
22 INJECTION, SOLUTION SUBCUTANEOUS
Qty: 0 | Refills: 0 | DISCHARGE
Start: 2020-02-21

## 2020-02-21 RX ORDER — SIMVASTATIN 20 MG/1
1 TABLET, FILM COATED ORAL
Qty: 0 | Refills: 0 | DISCHARGE
Start: 2020-02-21

## 2020-02-21 RX ORDER — FERROUS SULFATE 325(65) MG
1 TABLET ORAL
Qty: 30 | Refills: 0
Start: 2020-02-21 | End: 2020-03-21

## 2020-02-21 RX ORDER — FUROSEMIDE 40 MG
40 TABLET ORAL ONCE
Refills: 0 | Status: COMPLETED | OUTPATIENT
Start: 2020-02-21 | End: 2020-02-21

## 2020-02-21 RX ORDER — HYDRALAZINE HCL 50 MG
1 TABLET ORAL
Qty: 90 | Refills: 0
Start: 2020-02-21 | End: 2020-03-21

## 2020-02-21 RX ORDER — CARVEDILOL PHOSPHATE 80 MG/1
1 CAPSULE, EXTENDED RELEASE ORAL
Qty: 60 | Refills: 0
Start: 2020-02-21 | End: 2020-03-21

## 2020-02-21 RX ADMIN — CARVEDILOL PHOSPHATE 6.25 MILLIGRAM(S): 80 CAPSULE, EXTENDED RELEASE ORAL at 05:51

## 2020-02-21 RX ADMIN — Medication 10 UNIT(S): at 08:39

## 2020-02-21 RX ADMIN — Medication 10 UNIT(S): at 12:54

## 2020-02-21 RX ADMIN — HEPARIN SODIUM 5000 UNIT(S): 5000 INJECTION INTRAVENOUS; SUBCUTANEOUS at 05:51

## 2020-02-21 RX ADMIN — Medication 40 MILLIGRAM(S): at 11:49

## 2020-02-21 RX ADMIN — Medication 100 MILLIGRAM(S): at 13:03

## 2020-02-21 RX ADMIN — Medication 100 MILLIGRAM(S): at 05:52

## 2020-02-21 RX ADMIN — Medication 1: at 08:39

## 2020-02-21 NOTE — DISCHARGE NOTE NURSING/CASE MANAGEMENT/SOCIAL WORK - PATIENT PORTAL LINK FT
You can access the FollowMyHealth Patient Portal offered by [facility] by registering at the following website: [email or web address]. By joining [facility]’s FollowMyHealth portal, you will also be able to view your health information using other applications (apps) compatible with our system.

## 2020-02-21 NOTE — DISCHARGE NOTE PROVIDER - NSDCMRMEDTOKEN_GEN_ALL_CORE_FT
carvedilol 6.25 mg oral tablet: 1 tab(s) orally every 12 hours  ferrous sulfate 325 mg (65 mg elemental iron) oral delayed release tablet: 1 tab(s) orally once a day   hydrALAZINE 100 mg oral tablet: 1 tab(s) orally every 8 hours  insulin glargine: 22 unit(s) subcutaneous once a day (at bedtime)  Lasix 40 mg oral tablet: 1 tab(s) orally once a day   simvastatin 40 mg oral tablet: 1 tab(s) orally once a day (at bedtime)

## 2020-02-21 NOTE — DISCHARGE NOTE PROVIDER - NSDCCPCAREPLAN_GEN_ALL_CORE_FT
PRINCIPAL DISCHARGE DIAGNOSIS  Diagnosis: Acute on chronic diastolic heart failure  Assessment and Plan of Treatment: take medications as prescribed  follow up with nephrology and cardiology in 1 week.  repeat blood test in 1 week.      SECONDARY DISCHARGE DIAGNOSES  Diagnosis: Contrast dye induced nephropathy  Assessment and Plan of Treatment: improving    Diagnosis: Hypertensive urgency  Assessment and Plan of Treatment: medications adjusted

## 2020-02-21 NOTE — DISCHARGE NOTE PROVIDER - CARE PROVIDERS DIRECT ADDRESSES
[email or web address]

## 2020-02-21 NOTE — DISCHARGE NOTE PROVIDER - HOSPITAL COURSE
64 year old female with hx of DM2 , CKD3 morbid obesity presents for shortness of breath.    CTA chest negative for pulmonary embolism but noted with effusions and pulmonary edema.    patient developed contrast induced nephropathy with acei/lasix stopped.    Bun/Cr improved and lasix resumed.  episode of self limited NSVT noted. stress test with attenuation     HTN meds adjusted. states had negative sleep study in past and negative cath 6 years ago.        stable for discharge home.    dc planning 35 minutes.    vitals stable afebrile laying flat in bed w/o o2 or sob    aaox3 nad rrr s1s2 dec bs at bases soft abdomen +2 edema.    nonfocal neuro. ambulatory.

## 2020-02-21 NOTE — DISCHARGE NOTE PROVIDER - CARE PROVIDER_API CALL
Laura Vogt)  Internal Medicine; Nephrology  [address] [place] [address]  Phone: [phone]  Fax: [phone]  Follow Up Time:     Popeye Lange)  Cardiovascular Disease  [address] [facility], [place] [address]  Phone: [phone]  Fax: [phone]  Follow Up Time:

## 2020-02-24 ENCOUNTER — NON-APPOINTMENT (OUTPATIENT)
Age: 65
End: 2020-02-24

## 2020-02-24 ENCOUNTER — APPOINTMENT (OUTPATIENT)
Dept: CARDIOLOGY | Facility: CLINIC | Age: 65
End: 2020-02-24
Payer: COMMERCIAL

## 2020-02-24 VITALS
HEART RATE: 76 BPM | SYSTOLIC BLOOD PRESSURE: 138 MMHG | HEIGHT: 60 IN | BODY MASS INDEX: 43.19 KG/M2 | DIASTOLIC BLOOD PRESSURE: 62 MMHG | WEIGHT: 220 LBS | OXYGEN SATURATION: 96 %

## 2020-02-24 DIAGNOSIS — I50.32 CHRONIC DIASTOLIC (CONGESTIVE) HEART FAILURE: ICD-10-CM

## 2020-02-24 DIAGNOSIS — R06.83 SNORING: ICD-10-CM

## 2020-02-24 DIAGNOSIS — Z80.9 FAMILY HISTORY OF MALIGNANT NEOPLASM, UNSPECIFIED: ICD-10-CM

## 2020-02-24 DIAGNOSIS — R06.09 OTHER FORMS OF DYSPNEA: ICD-10-CM

## 2020-02-24 DIAGNOSIS — Z78.9 OTHER SPECIFIED HEALTH STATUS: ICD-10-CM

## 2020-02-24 DIAGNOSIS — Z87.891 PERSONAL HISTORY OF NICOTINE DEPENDENCE: ICD-10-CM

## 2020-02-24 DIAGNOSIS — I10 ESSENTIAL (PRIMARY) HYPERTENSION: ICD-10-CM

## 2020-02-24 DIAGNOSIS — Z86.2 PERSONAL HISTORY OF DISEASES OF THE BLOOD AND BLOOD-FORMING ORGANS AND CERTAIN DISORDERS INVOLVING THE IMMUNE MECHANISM: ICD-10-CM

## 2020-02-24 DIAGNOSIS — I27.20 PULMONARY HYPERTENSION, UNSPECIFIED: ICD-10-CM

## 2020-02-24 DIAGNOSIS — N18.9 CHRONIC KIDNEY DISEASE, UNSPECIFIED: ICD-10-CM

## 2020-02-24 DIAGNOSIS — E78.5 HYPERLIPIDEMIA, UNSPECIFIED: ICD-10-CM

## 2020-02-24 DIAGNOSIS — E10.9 TYPE 1 DIABETES MELLITUS W/OUT COMPLICATIONS: ICD-10-CM

## 2020-02-24 DIAGNOSIS — Z82.49 FAMILY HISTORY OF ISCHEMIC HEART DISEASE AND OTHER DISEASES OF THE CIRCULATORY SYSTEM: ICD-10-CM

## 2020-02-24 DIAGNOSIS — Z83.3 FAMILY HISTORY OF DIABETES MELLITUS: ICD-10-CM

## 2020-02-24 DIAGNOSIS — Z09 ENCOUNTER FOR FOLLOW-UP EXAMINATION AFTER COMPLETED TREATMENT FOR CONDITIONS OTHER THAN MALIGNANT NEOPLASM: ICD-10-CM

## 2020-02-24 DIAGNOSIS — R60.0 LOCALIZED EDEMA: ICD-10-CM

## 2020-02-24 PROBLEM — E11.9 TYPE 2 DIABETES MELLITUS WITHOUT COMPLICATIONS: Chronic | Status: ACTIVE | Noted: 2020-02-16

## 2020-02-24 PROCEDURE — 99214 OFFICE O/P EST MOD 30 MIN: CPT

## 2020-02-24 PROCEDURE — 93000 ELECTROCARDIOGRAM COMPLETE: CPT

## 2020-02-24 RX ORDER — FUROSEMIDE 40 MG/1
40 TABLET ORAL
Qty: 90 | Refills: 1 | Status: ACTIVE | COMMUNITY
Start: 2020-02-21

## 2020-02-24 RX ORDER — HYDRALAZINE HYDROCHLORIDE 100 MG/1
100 TABLET ORAL 3 TIMES DAILY
Refills: 0 | Status: ACTIVE | COMMUNITY
Start: 2020-02-21

## 2020-02-24 RX ORDER — SIMVASTATIN 40 MG/1
40 TABLET, FILM COATED ORAL
Qty: 90 | Refills: 3 | Status: ACTIVE | COMMUNITY
Start: 2019-09-26

## 2020-02-24 RX ORDER — LOSARTAN POTASSIUM 100 MG/1
100 TABLET, FILM COATED ORAL
Qty: 90 | Refills: 0 | Status: DISCONTINUED | COMMUNITY
Start: 2019-09-26 | End: 2020-02-24

## 2020-02-24 RX ORDER — LISINOPRIL 40 MG/1
40 TABLET ORAL
Qty: 90 | Refills: 0 | Status: DISCONTINUED | COMMUNITY
Start: 2019-09-26 | End: 2020-02-24

## 2020-02-24 RX ORDER — METFORMIN HYDROCHLORIDE 1000 MG/1
1000 TABLET, COATED ORAL DAILY
Refills: 0 | Status: ACTIVE | COMMUNITY
Start: 2019-03-11

## 2020-02-24 RX ORDER — CHLORHEXIDINE GLUCONATE 4 %
325 (65 FE) LIQUID (ML) TOPICAL DAILY
Qty: 30 | Refills: 0 | Status: ACTIVE | COMMUNITY
Start: 2020-02-21

## 2020-02-24 RX ORDER — INSULIN GLARGINE 100 [IU]/ML
100 INJECTION, SOLUTION SUBCUTANEOUS
Refills: 0 | Status: ACTIVE | COMMUNITY
Start: 2019-05-06

## 2020-02-24 RX ORDER — EXENATIDE 250 UG/ML
10 INJECTION SUBCUTANEOUS
Refills: 0 | Status: ACTIVE | COMMUNITY
Start: 2019-05-06

## 2020-02-24 RX ORDER — CARVEDILOL 6.25 MG/1
6.25 TABLET, FILM COATED ORAL
Qty: 90 | Refills: 1 | Status: ACTIVE | COMMUNITY
Start: 2020-02-21

## 2020-02-24 RX ORDER — PIOGLITAZONE HYDROCHLORIDE 15 MG/1
15 TABLET ORAL DAILY
Refills: 0 | Status: ACTIVE | COMMUNITY
Start: 2020-01-20

## 2020-02-24 NOTE — HISTORY OF PRESENT ILLNESS
[FreeTextEntry1] : HOSPITAL DISCHARGE FU\par Pt was admitted to [facility] 2/16/2020 - 2/21/2020\par \par 63 y/o female with hx of IDDM2, HTN, HLD, CKD who presents with SOB and b/l LE edema x5 days. Pt was on a cruise when she developed S[place],  Troponin negative x1, proBNP 704. LE dopplers negative for DVT. CTA negative for PE but showing pulmonary edema and small b/l pleural effusions. EKG reveals ST with nonspecific T-wave abnormality. Pt states she had a cardiac cath at [facility] 6 years ago and was told everything was normal. She had NST during hospital stay which was nonischemic. \par \par Pt feels well. She is improved, Her breathing is improved. He le edema resolved. She admits snoring. Denied fever, chills, orthopnea, PND, palpitation, nausea, vomiting, hematuria, melena, syncope, near syncpe, \par \par FUNCTIONAL CAPACITY\par Reports <4.0 METS\par \par CHRONIC, STABLE CONDITIONS\par 1) Acute on Chronic Diastolic Heart Failure Admission to [facility] 2/16/2020 - 2/21/2020:  Echo 2/2020: EF 60-65%. Moderate pHTN. NST 2/2020: fixed defect. Attenuation artifact.On carvedilol 6.25 milliGRAM(s) Oral every 12 hours; hydrALAZINE 100 milliGRAM(s) Oral every 8 hours; simvastatin 40 milliGRAM(s) Oral at bedtime\par \par 2) CKD: Scr 1.81 (2/20/2020). \par \par 3) Telemetry while in [facility] 2/2020:  2.89 sec pause during monitor review. Patient asymptomatic.May need Sleep Apnea DOTY as outpt. if prolonged pauses recur may need reduce BB. \par \par        \par CARDIAC TESTING\par NST 2/2020\par Medium to Large fixed defect involving entire anterior and anterolateral segments with normal wall motion on gated study. Probably represents breast soft tissue attenuation artifact, needs to be clinically correlated.  No definite evidence of reversible ischemia. No prone imaging.\par \par Echo 2/2020\par  1. Technically difficult study with poor endocardial visualization.\par  2. Endocardial visualization was enhanced with intravenous echo contrast.\par  3. Left ventricular ejection fraction, by visual estimation, is 60 to 65%.\par  4. Normal global left ventricular systolic function.\par  5. Mildly increased LV wall thickness.\par  6. Normal left ventricular internal cavity size.\par  7. Left ventricle chordal calcification. Flattening of the interventricular septum suggestive of right ventricle pressure and/or volume overload.\par  8. The right ventricle is mildly enlarged with normal systolic function.\par  9. Normal right atrial size.\par 10. Normal left atrial size.\par 11. Mild thickening and calcification of the anterior and posterior mitral valve leaflets.\par 12. Mild aortic stenosis (peak velocity 1.7 m/s, mean gradient 7 mmHg, calculated CAMMY by continuity equation 1.5 cm^2).\par 13. Estimated pulmonary artery systolic pressure is 50.9 mmHg assuming a right atrial pressure of 3 mmHg, which is consistent with moderate pulmonary hypertension.\par 14. Trivial pericardial effusion.\par  \par CTA of Chest 2/2020\par 1. No pulmonary embolism.\par 2. Pulmonary edema.\par 3. Small bilateral pleural effusions.\par \par LE VEnous Doppler 2/2020\par No evidence of deep venous thrombosis in either lower extremity. See above discussion.\par \par \par \par

## 2020-02-24 NOTE — DISCUSSION/SUMMARY
[___ Week(s)] : [unfilled] week(s) [Patient] : the patient [FreeTextEntry3] : Pt is to follow with cardiology in [place].

## 2020-02-24 NOTE — PHYSICAL EXAM
[General Appearance - Well Developed] : well developed [Normal Appearance] : normal appearance [Well Groomed] : well groomed [General Appearance - Well Nourished] : well nourished [No Deformities] : no deformities [General Appearance - In No Acute Distress] : no acute distress [Normal Conjunctiva] : the conjunctiva exhibited no abnormalities [Eyelids - No Xanthelasma] : the eyelids demonstrated no xanthelasmas [Normal Oral Mucosa] : normal oral mucosa [No Oral Pallor] : no oral pallor [No Oral Cyanosis] : no oral cyanosis [Normal Jugular Venous A Waves Present] : normal jugular venous A waves present [Normal Jugular Venous V Waves Present] : normal jugular venous V waves present [No Jugular Venous Figueroa A Waves] : no jugular venous figueroa A waves [Respiration, Rhythm And Depth] : normal respiratory rhythm and effort [Exaggerated Use Of Accessory Muscles For Inspiration] : no accessory muscle use [Auscultation Breath Sounds / Voice Sounds] : lungs were clear to auscultation bilaterally [Heart Rate And Rhythm] : heart rate and rhythm were normal [Heart Sounds] : normal S1 and S2 [Abdomen Soft] : soft [Murmurs] : no murmurs present [Abdomen Tenderness] : non-tender [Abdomen Mass (___ Cm)] : no abdominal mass palpated [Abnormal Walk] : normal gait [Gait - Sufficient For Exercise Testing] : the gait was sufficient for exercise testing [Nail Clubbing] : no clubbing of the fingernails [Cyanosis, Localized] : no localized cyanosis [Petechial Hemorrhages (___cm)] : no petechial hemorrhages [Skin Color & Pigmentation] : normal skin color and pigmentation [] : no rash [Skin Lesions] : no skin lesions [No Venous Stasis] : no venous stasis [No Skin Ulcers] : no skin ulcer [No Xanthoma] : no  xanthoma was observed [Oriented To Time, Place, And Person] : oriented to person, place, and time [Affect] : the affect was normal [Mood] : the mood was normal [No Anxiety] : not feeling anxious

## 2020-02-24 NOTE — REASON FOR VISIT
[Follow-Up - From Hospitalization] : follow-up of a recent hospitalization for [FreeTextEntry1] : Hospital discharge fu

## 2023-06-22 NOTE — ED ADULT NURSE NOTE - NS_SISCREENINGSR_GEN_ALL_ED
EMS report: woke up with room spinning dizziness. Vomited 2x. Worse/initiated with head movements. EMS gave zofran 4mg IV     Triage Assessment     Row Name 06/22/23 [id number]       Triage Assessment (Adult)    Airway WDL WDL       Respiratory WDL    Respiratory WDL WDL       Skin Circulation/Temperature WDL    Skin Circulation/Temperature WDL WDL       Cardiac WDL    Cardiac WDL WDL       Peripheral/Neurovascular WDL    Peripheral Neurovascular WDL WDL       Cognitive/Neuro/Behavioral WDL    Cognitive/Neuro/Behavioral WDL X  woke up with room spinning dizziness, especially with head movements; no focal deficits or weaknesses    Level of Consciousness alert    Arousal Level opens eyes spontaneously    Speech clear;spontaneous;logical       Pupils (CN II)    Pupil PERRLA yes       Joyce Coma Scale    Best Eye Response 4-->(E4) spontaneous    Best Motor Response 6-->(M6) obeys commands    Best Verbal Response 5-->(V5) oriented    Lupton Coma Scale Score 15              
Negative

## 2023-08-14 NOTE — PATIENT PROFILE ADULT - FALLEN IN THE PAST
----- Message from Yina White [phone] (home)        Thanks   Corbin       yes